# Patient Record
Sex: MALE | Race: OTHER | URBAN - METROPOLITAN AREA
[De-identification: names, ages, dates, MRNs, and addresses within clinical notes are randomized per-mention and may not be internally consistent; named-entity substitution may affect disease eponyms.]

---

## 2018-08-22 ENCOUNTER — INPATIENT (INPATIENT)
Facility: HOSPITAL | Age: 68
LOS: 9 days | Discharge: HOME | End: 2018-09-01
Attending: INTERNAL MEDICINE | Admitting: INTERNAL MEDICINE
Payer: COMMERCIAL

## 2018-08-22 VITALS
HEART RATE: 91 BPM | SYSTOLIC BLOOD PRESSURE: 96 MMHG | HEIGHT: 69 IN | RESPIRATION RATE: 16 BRPM | OXYGEN SATURATION: 93 % | TEMPERATURE: 98 F | WEIGHT: 149.91 LBS | DIASTOLIC BLOOD PRESSURE: 54 MMHG

## 2018-08-22 LAB
ALBUMIN SERPL ELPH-MCNC: 2.8 G/DL — LOW (ref 3.3–5)
ALP SERPL-CCNC: 60 U/L — SIGNIFICANT CHANGE UP (ref 40–120)
ALT FLD-CCNC: 37 U/L — SIGNIFICANT CHANGE UP (ref 12–78)
AMMONIA BLD-MCNC: 15 UMOL/L — SIGNIFICANT CHANGE UP (ref 11–32)
ANION GAP SERPL CALC-SCNC: 14 MMOL/L — SIGNIFICANT CHANGE UP (ref 5–17)
APAP SERPL-MCNC: < 2 UG/ML (ref 10–30)
APPEARANCE UR: CLEAR — SIGNIFICANT CHANGE UP
APTT BLD: 37.4 SEC — SIGNIFICANT CHANGE UP (ref 27.5–37.4)
AST SERPL-CCNC: 60 U/L — HIGH (ref 15–37)
BASE EXCESS BLDA CALC-SCNC: -6.3 MMOL/L — LOW (ref -2–2)
BILIRUB SERPL-MCNC: 1.5 MG/DL — HIGH (ref 0.2–1.2)
BILIRUB UR-MCNC: ABNORMAL
BLOOD GAS COMMENTS: SIGNIFICANT CHANGE UP
BLOOD GAS COMMENTS: SIGNIFICANT CHANGE UP
BLOOD GAS SOURCE: SIGNIFICANT CHANGE UP
BUN SERPL-MCNC: 31 MG/DL — HIGH (ref 7–23)
CALCIUM SERPL-MCNC: 8.3 MG/DL — LOW (ref 8.5–10.1)
CHLORIDE SERPL-SCNC: 103 MMOL/L — SIGNIFICANT CHANGE UP (ref 96–108)
CO2 SERPL-SCNC: 23 MMOL/L — SIGNIFICANT CHANGE UP (ref 22–31)
COLOR SPEC: ABNORMAL
CREAT SERPL-MCNC: 2.19 MG/DL — HIGH (ref 0.5–1.3)
DIFF PNL FLD: ABNORMAL
ETHANOL SERPL-MCNC: <10 MG/DL — SIGNIFICANT CHANGE UP (ref 0–10)
GLUCOSE BLDC GLUCOMTR-MCNC: 188 MG/DL — HIGH (ref 70–99)
GLUCOSE SERPL-MCNC: 181 MG/DL — HIGH (ref 70–99)
GLUCOSE UR QL: NEGATIVE MG/DL — SIGNIFICANT CHANGE UP
HCO3 BLDA-SCNC: 17 MMOL/L — LOW (ref 21–29)
HCT VFR BLD CALC: 34.8 % — LOW (ref 39–50)
HGB BLD-MCNC: 11.9 G/DL — LOW (ref 13–17)
HOROWITZ INDEX BLDA+IHG-RTO: 40 — SIGNIFICANT CHANGE UP
INR BLD: 1.45 RATIO — HIGH (ref 0.88–1.16)
KETONES UR-MCNC: ABNORMAL
LACTATE SERPL-SCNC: 5.7 MMOL/L — CRITICAL HIGH (ref 0.7–2)
LACTATE SERPL-SCNC: 5.8 MMOL/L — CRITICAL HIGH (ref 0.7–2)
LACTATE SERPL-SCNC: 6.8 MMOL/L — CRITICAL HIGH (ref 0.7–2)
LEUKOCYTE ESTERASE UR-ACNC: ABNORMAL
MCHC RBC-ENTMCNC: 33.2 PG — SIGNIFICANT CHANGE UP (ref 27–34)
MCHC RBC-ENTMCNC: 34.2 GM/DL — SIGNIFICANT CHANGE UP (ref 32–36)
MCV RBC AUTO: 97.2 FL — SIGNIFICANT CHANGE UP (ref 80–100)
NITRITE UR-MCNC: POSITIVE
NRBC # BLD: 0 /100 WBCS — SIGNIFICANT CHANGE UP (ref 0–0)
PCO2 BLDA: 28 MMHG — LOW (ref 32–46)
PCP SPEC-MCNC: SIGNIFICANT CHANGE UP
PH BLD: 7.39 — SIGNIFICANT CHANGE UP (ref 7.35–7.45)
PH UR: 5 — SIGNIFICANT CHANGE UP (ref 5–8)
PLATELET # BLD AUTO: 15 K/UL — CRITICAL LOW (ref 150–400)
PO2 BLDA: 105 MMHG — SIGNIFICANT CHANGE UP (ref 74–108)
POTASSIUM SERPL-MCNC: 4.2 MMOL/L — SIGNIFICANT CHANGE UP (ref 3.5–5.3)
POTASSIUM SERPL-SCNC: 4.2 MMOL/L — SIGNIFICANT CHANGE UP (ref 3.5–5.3)
PROT SERPL-MCNC: 7 GM/DL — SIGNIFICANT CHANGE UP (ref 6–8.3)
PROT UR-MCNC: 30 MG/DL
PROTHROM AB SERPL-ACNC: 15.9 SEC — HIGH (ref 9.8–12.7)
RBC # BLD: 3.58 M/UL — LOW (ref 4.2–5.8)
RBC # FLD: 14 % — SIGNIFICANT CHANGE UP (ref 10.3–14.5)
SALICYLATES SERPL-MCNC: <1.7 MG/DL (ref 2.8–20)
SAO2 % BLDA: 98 % — HIGH (ref 92–96)
SODIUM SERPL-SCNC: 140 MMOL/L — SIGNIFICANT CHANGE UP (ref 135–145)
SP GR SPEC: 1.02 — SIGNIFICANT CHANGE UP (ref 1.01–1.02)
TROPONIN I SERPL-MCNC: 0.02 NG/ML — SIGNIFICANT CHANGE UP (ref 0.01–0.04)
TSH SERPL-MCNC: 0.4 UIU/ML — SIGNIFICANT CHANGE UP (ref 0.36–3.74)
UROBILINOGEN FLD QL: 1 MG/DL
WBC # BLD: 8.34 K/UL — SIGNIFICANT CHANGE UP (ref 3.8–10.5)
WBC # FLD AUTO: 8.34 K/UL — SIGNIFICANT CHANGE UP (ref 3.8–10.5)

## 2018-08-22 PROCEDURE — 99291 CRITICAL CARE FIRST HOUR: CPT

## 2018-08-22 PROCEDURE — 70450 CT HEAD/BRAIN W/O DYE: CPT | Mod: 26

## 2018-08-22 PROCEDURE — 71045 X-RAY EXAM CHEST 1 VIEW: CPT | Mod: 26

## 2018-08-22 RX ORDER — VANCOMYCIN HCL 1 G
1000 VIAL (EA) INTRAVENOUS ONCE
Qty: 0 | Refills: 0 | Status: COMPLETED | OUTPATIENT
Start: 2018-08-22 | End: 2018-08-22

## 2018-08-22 RX ORDER — MIDAZOLAM HYDROCHLORIDE 1 MG/ML
1 INJECTION, SOLUTION INTRAMUSCULAR; INTRAVENOUS ONCE
Qty: 0 | Refills: 0 | Status: DISCONTINUED | OUTPATIENT
Start: 2018-08-22 | End: 2018-08-22

## 2018-08-22 RX ORDER — PIPERACILLIN AND TAZOBACTAM 4; .5 G/20ML; G/20ML
3.38 INJECTION, POWDER, LYOPHILIZED, FOR SOLUTION INTRAVENOUS ONCE
Qty: 0 | Refills: 0 | Status: COMPLETED | OUTPATIENT
Start: 2018-08-22 | End: 2018-08-22

## 2018-08-22 RX ORDER — IBUPROFEN 200 MG
400 TABLET ORAL ONCE
Qty: 0 | Refills: 0 | Status: COMPLETED | OUTPATIENT
Start: 2018-08-22 | End: 2018-08-22

## 2018-08-22 RX ORDER — SODIUM CHLORIDE 9 MG/ML
2000 INJECTION INTRAMUSCULAR; INTRAVENOUS; SUBCUTANEOUS ONCE
Qty: 0 | Refills: 0 | Status: COMPLETED | OUTPATIENT
Start: 2018-08-22 | End: 2018-08-22

## 2018-08-22 RX ORDER — ACETAMINOPHEN 500 MG
650 TABLET ORAL ONCE
Qty: 0 | Refills: 0 | Status: COMPLETED | OUTPATIENT
Start: 2018-08-22 | End: 2018-08-22

## 2018-08-22 RX ORDER — HEPARIN SODIUM 5000 [USP'U]/ML
5000 INJECTION INTRAVENOUS; SUBCUTANEOUS EVERY 12 HOURS
Qty: 0 | Refills: 0 | Status: DISCONTINUED | OUTPATIENT
Start: 2018-08-22 | End: 2018-08-22

## 2018-08-22 RX ORDER — INSULIN GLARGINE 100 [IU]/ML
16 INJECTION, SOLUTION SUBCUTANEOUS
Qty: 0 | Refills: 0 | COMMUNITY

## 2018-08-22 RX ORDER — ACARBOSE 25 MG/1
1 TABLET ORAL
Qty: 0 | Refills: 0 | COMMUNITY

## 2018-08-22 RX ORDER — OMEPRAZOLE 10 MG/1
1 CAPSULE, DELAYED RELEASE ORAL
Qty: 0 | Refills: 0 | COMMUNITY

## 2018-08-22 RX ORDER — NADOLOL 80 MG/1
2 TABLET ORAL
Qty: 0 | Refills: 0 | COMMUNITY

## 2018-08-22 RX ORDER — NOREPINEPHRINE BITARTRATE/D5W 8 MG/250ML
0.05 PLASTIC BAG, INJECTION (ML) INTRAVENOUS
Qty: 8 | Refills: 0 | Status: DISCONTINUED | OUTPATIENT
Start: 2018-08-22 | End: 2018-08-26

## 2018-08-22 RX ADMIN — SODIUM CHLORIDE 2000 MILLILITER(S): 9 INJECTION INTRAMUSCULAR; INTRAVENOUS; SUBCUTANEOUS at 18:05

## 2018-08-22 RX ADMIN — PIPERACILLIN AND TAZOBACTAM 3.38 GRAM(S): 4; .5 INJECTION, POWDER, LYOPHILIZED, FOR SOLUTION INTRAVENOUS at 16:45

## 2018-08-22 RX ADMIN — MIDAZOLAM HYDROCHLORIDE 1 MILLIGRAM(S): 1 INJECTION, SOLUTION INTRAMUSCULAR; INTRAVENOUS at 19:46

## 2018-08-22 RX ADMIN — Medication 250 MILLIGRAM(S): at 16:43

## 2018-08-22 RX ADMIN — Medication 650 MILLIGRAM(S): at 16:29

## 2018-08-22 RX ADMIN — SODIUM CHLORIDE 2000 MILLILITER(S): 9 INJECTION INTRAMUSCULAR; INTRAVENOUS; SUBCUTANEOUS at 14:20

## 2018-08-22 RX ADMIN — Medication 6.38 MICROGRAM(S)/KG/MIN: at 18:27

## 2018-08-22 RX ADMIN — PIPERACILLIN AND TAZOBACTAM 200 GRAM(S): 4; .5 INJECTION, POWDER, LYOPHILIZED, FOR SOLUTION INTRAVENOUS at 16:20

## 2018-08-22 RX ADMIN — Medication 400 MILLIGRAM(S): at 19:35

## 2018-08-22 NOTE — ED ADULT NURSE REASSESSMENT NOTE - NS ED NURSE REASSESS COMMENT FT1
Pt arrived in the Emergency Department and was A&Ox1. After receiving the fluid bolus, the patient began to perk up. The fluids had approximately 400cc infused at the time and the patient was speaking and able to verbalize his name, his date of birth. The pt still did not know where he was or why he was here. Pt had no complaints of pain at the time of the infusion or at any time during his stay. Pt is currently A&OX4 and is able to make his needs known. Pt is verbal and speaking coherently to both myself and to his family. Family states he is speaking coherently and making sense. Pt is currently awaiting the results of the repeated lactic acid which was drawn at 1725 and sent to the lab for evaluation. 2000CC of fluids completed at 1720 and immediately following, the lab was drawn. Pt is vocal and is verbalizing a desire to have a bowel movement, the family states that the pt appears to be at/or near his baseline status. Pt is stating he has no pain or discomfort at this time. Pt is not in any distress. Pt is on the bedside monitor with his ABX still running. Bilateral IV's are patent and flush without difficulty. Pt IV's are clean dry and intact. Pt assisted to the commode to have a bowel movement with the assistance of myself and the PCT. Vital signs recorded and placed in the EMR.

## 2018-08-22 NOTE — ED PROVIDER NOTE - PROGRESS NOTE DETAILS
after 2 L fluid and antbx, pt. now alert and oriented x3, speaking in full sentences and conversing with staff, back to baseline as per daughter  labs are grossly unremarkable thus far with exception of elevated lactate (which will be repeated now), low platelets, + UTI, AMBIKA with Cr of 2.19 (?dehydration)  will admit to medicine for further care; initial ICU consult cancelled now because of significant improvement in clinical status pt. becoming hypotensive, starting to get altered again, medicine requests ICU reconsult given unstable pressure and mental status changes

## 2018-08-22 NOTE — H&P ADULT - ATTENDING COMMENTS
Pt seen and examined in ED with PA    68M PMH of Hep B, liver cirrhosis on transplant list as per family, hepatocellular carcinoma s/p ablation, DM, HTN, CAD w/ stents, thrombocytopenia, brought in by family for AMS. As per daughter, pt has had increasing confusion, febrile, unable to put on pants this am, urinating on self, which began this morning. Temp in .2, hypotensive despite 4LNS so started on levophed, confused w/ lactate of 5.8. Admit to ICU for severe sepsis, septic shock, UTI, r/o bacteremia, respiratory distress in setting of SIRS response requiring BiPAP for work of breathing, acute encephalopathy, ARF with ATN    - check blood and urine cx  - cont zoysn for suspicions of gram negative sepsis due to urinary source  - ARF likely ATn from sepsis. Burton catheter  - thrombocytopenia in setting of cirrhosis  - levophed for BP support, shock state, maintain MAP > 65  - BiPAP for respiratory support, intubation if necessary  - spoke to family (daughter and wife)  - monitor blood sugar, sliding scale coverage  - NPO for now due to mental status  - pt full code    Critical Care Time: 60 min

## 2018-08-22 NOTE — H&P ADULT - NSHPLABSRESULTS_GEN_ALL_CORE
EXAM:  CT BRAIN                            PROCEDURE DATE:  08/22/2018          INTERPRETATION:  CLINICAL INFORMATION: Altered mental status this   morning, history of hepatitis C. Urinary incontinence.    COMPARISON: None available.    PROCEDURE:   Noncontrast CT of the Head was performed from the skull base to the   vertex. Coronal and Sagittal reformats were obtained.    FINDINGS:    There is no acute intracranial hemorrhage, mass effect, midline shift,   extra-axial collection, hydrocephalus, or evidence of acute vascular   territorial infarction. Mild patchy hypodensities within the   periventricular and subcortical white matter, although nonspecific,   likely reflect chronic microvascular disease. Cerebral white loss results   in mildprominence of the ventricles and sulci. Intracranial   atherosclerosis is present.    The visualized paranasal sinuses and mastoid air cells are clear.   Underpneumatized left mastoid air cells with partial opacification of the   left middle ear cavity. Paranasal sinuses are clear. Intraorbital   contents are unremarkable.    IMPRESSION:     No acute intracranial hemorrhage, mass effect, or evidence of acute   vascular territorial infarction.    Nonspecific partial opacification of the left middle ear cavity.    If clinical symptoms persist or worsen, more sensitive evaluation with   brain MRI may be obtained, if no contraindications exist.                JUAN CARLOS MELÉNDEZ M.D., ATTENDING RADIOLOGIST  This document has been electronically signed. Aug 22 2018  4:42PM

## 2018-08-22 NOTE — ED PROVIDER NOTE - MEDICAL DECISION MAKING DETAILS
69 yo M with hx hep C cirrhosis, AMS, no other source or inciting event, likely hepatic encephalopathy, r/o other causes of AMS  -sepsis w/u, AMS w/u including etoh, drug screen, ua, cx, salicylate, tsh, tylenol, lactate, ct brain, cxr, ekg, monitor, enhanced obs, rectal temp, finger stick, NS bolus hydration  -f/u results, reeval

## 2018-08-22 NOTE — ED PROVIDER NOTE - PHYSICAL EXAMINATION
Vitals: 93% on RA otherwise normal  Gen: answers yes and no to questions, no meaningful communication, NAD, laying comfortably in stretcher  Head: ncat, perrla, eomi b/l  Neck: supple, no lymphadenopathy, no midline deviation  Heart: rrr, no m/r/g  Lungs: CTA b/l, no rales/ronchi/wheezes  Abd: soft, nontender, non-distended, no rebound or guarding  Ext: no clubbing/cyanosis/edema  Neuro: sensation and muscle strength intact b/l, steady gait Vitals: 93% on RA otherwise normal  Gen: answers yes and no to questions, no meaningful communication, NAD, laying comfortably in stretcher  Head: ncat, perrla, eomi b/l  Neck: supple, no lymphadenopathy, no midline deviation  Heart: rrr, no m/r/g  Lungs: CTA b/l, no rales/ronchi/wheezes  Abd: soft, nontender, non-distended, no rebound or guarding  Ext: no clubbing/cyanosis/edema  Neuro: sensation and muscle strength intact b/l

## 2018-08-22 NOTE — H&P ADULT - HISTORY OF PRESENT ILLNESS
69 Y/O male w/ PMHx of Hep B, liver cirrhosis on transplant list as per family, DM, HTN, brought in by family for AMS. As per daughter, pt has had increasing confusion, febrile, unable to put on pants this am, urinating on self, which began this morning. While in ED, pt was noted to be febrile w/ temp of 104.2, hypotensive despite 4LNS so started on levophed, confused w/ lactate of 5.8. ICU called for further evaluation. 69 Y/O male w/ PMHx of Hep B, liver cirrhosis on transplant list as per family, DM, HTN, CAD w/ stents, brought in by family for AMS. As per daughter, pt has had increasing confusion, febrile, unable to put on pants this am, urinating on self, which began this morning. While in ED, pt was noted to be febrile w/ temp of 104.2, hypotensive despite 4LNS so started on levophed, confused w/ lactate of 5.8. ICU called for further evaluation.

## 2018-08-22 NOTE — H&P ADULT - ASSESSMENT
Called to see 69 Y/O male w/ PMHx of Hep B, liver cirrhosis on transplant list, DM, HTN, brought in by family for AMS, for temp of 104.2, hypotension despite 4LNS so started on levophed, and lactate of 5.8.     Pulm:  - repeat abg, cxr  - continue bipap and/or O2 supplementation as needed  - d/c bipapa as tolerated    Cardiac:  - continue IV fluid replacement as needed   - monitor H/H, platelets  - compression devices for DVT plophylaxis  - continue pressor support as needed     ID:  - continue broad spectrum IV abx coverage  - continue to trend lactate  - f/u cultures, adjust abx coverage as needed    General:  - admit pt to ICU under Dr. Cohn w/ hospitalist service for medicine  - continue to discuss goals of care w/ family

## 2018-08-22 NOTE — ED ADULT NURSE REASSESSMENT NOTE - NS ED NURSE REASSESS COMMENT FT1
Pt Pt was placed on Norepinephrine for a MAP of >65, BP is being maintained with titration of Norpinephrine that started at a dose of 0.05 and was titrated to achieve the BP desired by the MD. Titration will continue to occur until the pts Blood pressures have properly maintained at > 65 MAP. Pt was placed on Norepinephrine for a MAP of >65, BP is being maintained with titration of Norpinephrine that started at a dose of 0.05 and was titrated to achieve the BP desired by the MD. Titration will continue to occur until the pts Blood pressures have properly maintained at > 65 MAP. MAO > 65 was achieved by monitoring blood pressure and adjusting and titrating the rate of the Norepinephrine. Report given to receiving RN Lloyd, pts history, current condition and reason for admission discussed and reviewed, pt currently in stable condition, all 3 IV's, left AC. Right AC and right wrist IV patent and running, dressing is clean dry and intact, pts family is aware of plan of care. Pt family education deemed successful after successful teach back proves proficiency. Pt transferred up to ICU bed 4 with respiratory, Emergency Department tech, and myself. Pt transported on BiPap with the ICU PA present. Pt transported and transferred to the ICU's care without any change in patient condition.

## 2018-08-22 NOTE — ED PROVIDER NOTE - OBJECTIVE STATEMENT
67 yo M with confusion since this morning.  Pt. could not put on his pants, confused, urinating on himself.  Daughter was concerned and brought him to ER.  Pt.'s last known normal was last night at 5pm when daughter last spoke to him.  As per wife he only started to act strange on waking this morning.  No other inciting event.  No recent complaints or issues, pt. was feeling well prior to this.  ROS: unobtainable from patient  PMH: IDDM, Hep C w/Cirrhosis; Meds: Glargine, Acarbose (started two weeks ago), omeprazole, nadolol; SH: Denies smoking/drinking/drug use

## 2018-08-22 NOTE — ED PROVIDER NOTE - CRITICAL CARE PROVIDED
consult w/ pt's family directly relating to pts condition/documentation/direct patient care (not related to procedure)/additional history taking/interpretation of diagnostic studies/consultation with other physicians/conducted a detailed discussion of DNR status

## 2018-08-22 NOTE — ED ADULT NURSE NOTE - OBJECTIVE STATEMENT
Pt is a 68YOM who was brought in by his daughter for confusion. Pt was last seen yesterday by his daughter and states the pt was A&OX4, independent and self care. This morning, the pts mother reports that she called the daughter and the pt was unresponsive, lethargic and confused when he finally woke up. The daughter states she carried her dad to the car and brought him here to [facility]. Pt upon arrival is lethargic, responsive, but there is no response that was correct other than his name. Pt is currently A&OX1.

## 2018-08-22 NOTE — ED ADULT NURSE NOTE - NSIMPLEMENTINTERV_GEN_ALL_ED
Implemented All Fall with Harm Risk Interventions:  [place] to call system. Call bell, personal items and telephone within reach. Instruct patient to call for assistance. Room bathroom lighting operational. Non-slip footwear when patient is off stretcher. Physically safe environment: no spills, clutter or unnecessary equipment. Stretcher in lowest position, wheels locked, appropriate side rails in place. Provide visual cue, wrist band, yellow gown, etc. Monitor gait and stability. Monitor for mental status changes and reorient to person, place, and time. Review medications for side effects contributing to fall risk. Reinforce activity limits and safety measures with patient and family. Provide visual clues: red socks.

## 2018-08-23 LAB
ALBUMIN SERPL ELPH-MCNC: 2.5 G/DL — LOW (ref 3.3–5)
ALBUMIN SERPL ELPH-MCNC: 2.5 G/DL — LOW (ref 3.3–5)
ALP SERPL-CCNC: 59 U/L — SIGNIFICANT CHANGE UP (ref 40–120)
ALP SERPL-CCNC: 68 U/L — SIGNIFICANT CHANGE UP (ref 40–120)
ALT FLD-CCNC: 35 U/L — SIGNIFICANT CHANGE UP (ref 12–78)
ALT FLD-CCNC: 36 U/L — SIGNIFICANT CHANGE UP (ref 12–78)
ANION GAP SERPL CALC-SCNC: 15 MMOL/L — SIGNIFICANT CHANGE UP (ref 5–17)
APTT BLD: 49 SEC — HIGH (ref 27.5–37.4)
AST SERPL-CCNC: 59 U/L — HIGH (ref 15–37)
AST SERPL-CCNC: 59 U/L — HIGH (ref 15–37)
BASE EXCESS BLDA CALC-SCNC: -6.4 MMOL/L — LOW (ref -2–2)
BASOPHILS # BLD AUTO: 0.06 K/UL — SIGNIFICANT CHANGE UP (ref 0–0.2)
BASOPHILS NFR BLD AUTO: 0.3 % — SIGNIFICANT CHANGE UP (ref 0–2)
BILIRUB DIRECT SERPL-MCNC: 1.77 MG/DL — HIGH (ref 0.05–0.2)
BILIRUB INDIRECT FLD-MCNC: 1.8 MG/DL — HIGH (ref 0.2–1)
BILIRUB SERPL-MCNC: 3.6 MG/DL — HIGH (ref 0.2–1.2)
BILIRUB SERPL-MCNC: 3.6 MG/DL — HIGH (ref 0.2–1.2)
BLD GP AB SCN SERPL QL: SIGNIFICANT CHANGE UP
BLOOD GAS COMMENTS: SIGNIFICANT CHANGE UP
BLOOD GAS COMMENTS: SIGNIFICANT CHANGE UP
BLOOD GAS SOURCE: SIGNIFICANT CHANGE UP
BUN SERPL-MCNC: 39 MG/DL — HIGH (ref 7–23)
CALCIUM SERPL-MCNC: 7.3 MG/DL — LOW (ref 8.5–10.1)
CHLORIDE SERPL-SCNC: 108 MMOL/L — SIGNIFICANT CHANGE UP (ref 96–108)
CK SERPL-CCNC: 293 U/L — SIGNIFICANT CHANGE UP (ref 26–308)
CO2 SERPL-SCNC: 19 MMOL/L — LOW (ref 22–31)
CREAT SERPL-MCNC: 2.09 MG/DL — HIGH (ref 0.5–1.3)
E COLI DNA BLD POS QL NAA+NON-PROBE: SIGNIFICANT CHANGE UP
EOSINOPHIL # BLD AUTO: 0.02 K/UL — SIGNIFICANT CHANGE UP (ref 0–0.5)
EOSINOPHIL NFR BLD AUTO: 0.1 % — SIGNIFICANT CHANGE UP (ref 0–6)
GLUCOSE BLDC GLUCOMTR-MCNC: 122 MG/DL — HIGH (ref 70–99)
GLUCOSE BLDC GLUCOMTR-MCNC: 97 MG/DL — SIGNIFICANT CHANGE UP (ref 70–99)
GLUCOSE SERPL-MCNC: 131 MG/DL — HIGH (ref 70–99)
GRAM STN FLD: SIGNIFICANT CHANGE UP
GRAM STN FLD: SIGNIFICANT CHANGE UP
HAV IGM SER-ACNC: SIGNIFICANT CHANGE UP
HBA1C BLD-MCNC: 6.8 % — HIGH (ref 4–5.6)
HBV CORE IGM SER-ACNC: SIGNIFICANT CHANGE UP
HBV SURFACE AG SER-ACNC: SIGNIFICANT CHANGE UP
HCO3 BLDA-SCNC: 17 MMOL/L — LOW (ref 21–29)
HCT VFR BLD CALC: 34.7 % — LOW (ref 39–50)
HCV AB S/CO SERPL IA: 0.14 S/CO — SIGNIFICANT CHANGE UP
HCV AB SERPL-IMP: SIGNIFICANT CHANGE UP
HGB BLD-MCNC: 12 G/DL — LOW (ref 13–17)
HOROWITZ INDEX BLDA+IHG-RTO: 40 — SIGNIFICANT CHANGE UP
IMM GRANULOCYTES NFR BLD AUTO: 3.6 % — HIGH (ref 0–1.5)
INR BLD: 1.81 RATIO — HIGH (ref 0.88–1.16)
LACTATE SERPL-SCNC: 4.8 MMOL/L — CRITICAL HIGH (ref 0.7–2)
LYMPHOCYTES # BLD AUTO: 0.94 K/UL — LOW (ref 1–3.3)
LYMPHOCYTES # BLD AUTO: 4.3 % — LOW (ref 13–44)
MAGNESIUM SERPL-MCNC: 1.5 MG/DL — LOW (ref 1.6–2.6)
MCHC RBC-ENTMCNC: 33.5 PG — SIGNIFICANT CHANGE UP (ref 27–34)
MCHC RBC-ENTMCNC: 34.6 GM/DL — SIGNIFICANT CHANGE UP (ref 32–36)
MCV RBC AUTO: 96.9 FL — SIGNIFICANT CHANGE UP (ref 80–100)
METHOD TYPE: SIGNIFICANT CHANGE UP
MONOCYTES # BLD AUTO: 1.52 K/UL — HIGH (ref 0–0.9)
MONOCYTES NFR BLD AUTO: 7 % — SIGNIFICANT CHANGE UP (ref 2–14)
NEUTROPHILS # BLD AUTO: 18.43 K/UL — HIGH (ref 1.8–7.4)
NEUTROPHILS NFR BLD AUTO: 84.7 % — HIGH (ref 43–77)
NRBC # BLD: 0 /100 WBCS — SIGNIFICANT CHANGE UP (ref 0–0)
PCO2 BLDA: 30 MMHG — LOW (ref 32–46)
PH BLD: 7.38 — SIGNIFICANT CHANGE UP (ref 7.35–7.45)
PHOSPHATE SERPL-MCNC: 3.3 MG/DL — SIGNIFICANT CHANGE UP (ref 2.5–4.5)
PLATELET # BLD AUTO: 23 K/UL — LOW (ref 150–400)
PO2 BLDA: 119 MMHG — HIGH (ref 74–108)
POTASSIUM SERPL-MCNC: 3.9 MMOL/L — SIGNIFICANT CHANGE UP (ref 3.5–5.3)
POTASSIUM SERPL-SCNC: 3.9 MMOL/L — SIGNIFICANT CHANGE UP (ref 3.5–5.3)
PROT SERPL-MCNC: 6.2 GM/DL — SIGNIFICANT CHANGE UP (ref 6–8.3)
PROT SERPL-MCNC: 6.2 GM/DL — SIGNIFICANT CHANGE UP (ref 6–8.3)
PROTHROM AB SERPL-ACNC: 20 SEC — HIGH (ref 9.8–12.7)
RBC # BLD: 3.58 M/UL — LOW (ref 4.2–5.8)
RBC # FLD: 14.6 % — HIGH (ref 10.3–14.5)
SAO2 % BLDA: 98 % — HIGH (ref 92–96)
SODIUM SERPL-SCNC: 142 MMOL/L — SIGNIFICANT CHANGE UP (ref 135–145)
SPECIMEN SOURCE: SIGNIFICANT CHANGE UP
SPECIMEN SOURCE: SIGNIFICANT CHANGE UP
WBC # BLD: 21.75 K/UL — HIGH (ref 3.8–10.5)
WBC # FLD AUTO: 21.75 K/UL — HIGH (ref 3.8–10.5)

## 2018-08-23 PROCEDURE — 99291 CRITICAL CARE FIRST HOUR: CPT

## 2018-08-23 PROCEDURE — 76700 US EXAM ABDOM COMPLETE: CPT | Mod: 26

## 2018-08-23 PROCEDURE — 93010 ELECTROCARDIOGRAM REPORT: CPT

## 2018-08-23 RX ORDER — DEXTROSE 50 % IN WATER 50 %
25 SYRINGE (ML) INTRAVENOUS ONCE
Qty: 0 | Refills: 0 | Status: DISCONTINUED | OUTPATIENT
Start: 2018-08-23 | End: 2018-09-01

## 2018-08-23 RX ORDER — MAGNESIUM SULFATE 500 MG/ML
1 VIAL (ML) INJECTION ONCE
Qty: 0 | Refills: 0 | Status: COMPLETED | OUTPATIENT
Start: 2018-08-23 | End: 2018-08-23

## 2018-08-23 RX ORDER — VANCOMYCIN HCL 1 G
500 VIAL (EA) INTRAVENOUS EVERY 12 HOURS
Qty: 0 | Refills: 0 | Status: DISCONTINUED | OUTPATIENT
Start: 2018-08-23 | End: 2018-08-24

## 2018-08-23 RX ORDER — ACETAMINOPHEN 500 MG
650 TABLET ORAL EVERY 6 HOURS
Qty: 0 | Refills: 0 | Status: DISCONTINUED | OUTPATIENT
Start: 2018-08-23 | End: 2018-09-01

## 2018-08-23 RX ORDER — PIPERACILLIN AND TAZOBACTAM 4; .5 G/20ML; G/20ML
3.38 INJECTION, POWDER, LYOPHILIZED, FOR SOLUTION INTRAVENOUS EVERY 8 HOURS
Qty: 0 | Refills: 0 | Status: DISCONTINUED | OUTPATIENT
Start: 2018-08-23 | End: 2018-08-25

## 2018-08-23 RX ORDER — GLUCAGON INJECTION, SOLUTION 0.5 MG/.1ML
1 INJECTION, SOLUTION SUBCUTANEOUS ONCE
Qty: 0 | Refills: 0 | Status: DISCONTINUED | OUTPATIENT
Start: 2018-08-23 | End: 2018-09-01

## 2018-08-23 RX ORDER — GENTAMICIN SULFATE 40 MG/ML
80 VIAL (ML) INJECTION ONCE
Qty: 0 | Refills: 0 | Status: DISCONTINUED | OUTPATIENT
Start: 2018-08-23 | End: 2018-08-23

## 2018-08-23 RX ORDER — SODIUM CHLORIDE 9 MG/ML
1000 INJECTION, SOLUTION INTRAVENOUS
Qty: 0 | Refills: 0 | Status: DISCONTINUED | OUTPATIENT
Start: 2018-08-23 | End: 2018-09-01

## 2018-08-23 RX ORDER — DEXTROSE 50 % IN WATER 50 %
12.5 SYRINGE (ML) INTRAVENOUS ONCE
Qty: 0 | Refills: 0 | Status: DISCONTINUED | OUTPATIENT
Start: 2018-08-23 | End: 2018-09-01

## 2018-08-23 RX ORDER — GENTAMICIN SULFATE 40 MG/ML
120 VIAL (ML) INJECTION ONCE
Qty: 0 | Refills: 0 | Status: COMPLETED | OUTPATIENT
Start: 2018-08-23 | End: 2018-08-23

## 2018-08-23 RX ORDER — INSULIN LISPRO 100/ML
VIAL (ML) SUBCUTANEOUS
Qty: 0 | Refills: 0 | Status: DISCONTINUED | OUTPATIENT
Start: 2018-08-23 | End: 2018-09-01

## 2018-08-23 RX ORDER — DEXTROSE 50 % IN WATER 50 %
15 SYRINGE (ML) INTRAVENOUS ONCE
Qty: 0 | Refills: 0 | Status: DISCONTINUED | OUTPATIENT
Start: 2018-08-23 | End: 2018-09-01

## 2018-08-23 RX ORDER — SODIUM CHLORIDE 9 MG/ML
1000 INJECTION, SOLUTION INTRAVENOUS
Qty: 0 | Refills: 0 | Status: DISCONTINUED | OUTPATIENT
Start: 2018-08-23 | End: 2018-08-26

## 2018-08-23 RX ORDER — INSULIN LISPRO 100/ML
VIAL (ML) SUBCUTANEOUS AT BEDTIME
Qty: 0 | Refills: 0 | Status: DISCONTINUED | OUTPATIENT
Start: 2018-08-23 | End: 2018-09-01

## 2018-08-23 RX ORDER — SODIUM CHLORIDE 9 MG/ML
500 INJECTION, SOLUTION INTRAVENOUS ONCE
Qty: 0 | Refills: 0 | Status: COMPLETED | OUTPATIENT
Start: 2018-08-23 | End: 2018-08-23

## 2018-08-23 RX ORDER — SODIUM CHLORIDE 9 MG/ML
1000 INJECTION INTRAMUSCULAR; INTRAVENOUS; SUBCUTANEOUS
Qty: 0 | Refills: 0 | Status: DISCONTINUED | OUTPATIENT
Start: 2018-08-23 | End: 2018-08-23

## 2018-08-23 RX ADMIN — Medication 650 MILLIGRAM(S): at 19:52

## 2018-08-23 RX ADMIN — PIPERACILLIN AND TAZOBACTAM 25 GRAM(S): 4; .5 INJECTION, POWDER, LYOPHILIZED, FOR SOLUTION INTRAVENOUS at 14:10

## 2018-08-23 RX ADMIN — Medication 0: at 21:21

## 2018-08-23 RX ADMIN — Medication 6.38 MICROGRAM(S)/KG/MIN: at 12:00

## 2018-08-23 RX ADMIN — Medication 100 GRAM(S): at 07:15

## 2018-08-23 RX ADMIN — Medication 100 MILLIGRAM(S): at 05:50

## 2018-08-23 RX ADMIN — PIPERACILLIN AND TAZOBACTAM 25 GRAM(S): 4; .5 INJECTION, POWDER, LYOPHILIZED, FOR SOLUTION INTRAVENOUS at 21:09

## 2018-08-23 RX ADMIN — Medication 100 MILLIGRAM(S): at 17:40

## 2018-08-23 RX ADMIN — SODIUM CHLORIDE 75 MILLILITER(S): 9 INJECTION INTRAMUSCULAR; INTRAVENOUS; SUBCUTANEOUS at 03:31

## 2018-08-23 RX ADMIN — SODIUM CHLORIDE 1000 MILLILITER(S): 9 INJECTION, SOLUTION INTRAVENOUS at 01:05

## 2018-08-23 RX ADMIN — SODIUM CHLORIDE 75 MILLILITER(S): 9 INJECTION, SOLUTION INTRAVENOUS at 19:56

## 2018-08-23 RX ADMIN — PIPERACILLIN AND TAZOBACTAM 25 GRAM(S): 4; .5 INJECTION, POWDER, LYOPHILIZED, FOR SOLUTION INTRAVENOUS at 05:50

## 2018-08-23 RX ADMIN — Medication 6.38 MICROGRAM(S)/KG/MIN: at 02:36

## 2018-08-23 RX ADMIN — Medication 200 MILLIGRAM(S): at 12:42

## 2018-08-23 NOTE — PROGRESS NOTE ADULT - ASSESSMENT
Pt is a 69 yo AM with h/o CAD/stent, HTN, DM, ? Hep B, cirrhosis on transplant list as per family. Pt was brought in by family for AMS. As per daughter, pt has had increasing confusion, febrile, unable to put on pants, urinating on self, which began this morning. In the ER, pt was noted to be confused, febrile (temp of 104.2), hypotensive despite 4LNS with increased lactate and started on levophed. Today bldCx (+) for E. coli. Admitting dx: E. coli septic shock 2 to UTI vs biliary source (US abd: Cirrhotic configuration of the liver. Indeterminate hepatic lesion measuring 2.8 cm. Contrast enhanced MR may be obtained for further characterization. Splenomegaly. Cholelithiasis with nonspecific gallbladder wall thickening. No biliary ductal dilatation. Pleural effusions.    Resp: Supplemental O2 prn  ID: F/u E. coli sensitivities; Cont Zosyn and give 1 dose Aminoglycoside  CVS: Levophed  tapering off  FEN: May advance diet  GI: GI consult  Endo: Follow FS and cover with Lispro  Renal: Pt with ? AMBIKA; hydrate and follow BUN/Cr and UO    CCT: 35 min

## 2018-08-23 NOTE — DIETITIAN INITIAL EVALUATION ADULT. - NS AS NUTRI INTERV MEALS SNACK
Other (specify)/Carbohydrate - modified diet/Recommend CCHO with snack Carbohydrate - modified diet/Other (specify)/Recommend CCHO with snack/Low Na/Mineral - modified diet

## 2018-08-23 NOTE — DIETITIAN INITIAL EVALUATION ADULT. - ADHERENCE
good/Diabetic diet; Breakfast; toast, coffee or tea; Afternoon meal; noodles & meat or fish; Pt drinks water & no snacks or po supplements

## 2018-08-23 NOTE — PROGRESS NOTE ADULT - SUBJECTIVE AND OBJECTIVE BOX
HPI:  Pt is a 67 yo AM with h/o CAD/stent, HTN, DM, ? Hep B, cirrhosis on transplant list as per family. Pt was brought in by family for AMS. As per daughter, pt has had increasing confusion, febrile, unable to put on pants, urinating on self, which began this morning. In the ER, pt was noted to be confused, febrile (temp of 104.2), hypotensive despite 4LNS with increased lactate and started on levophed. Today bldCx (+) for E. coli. Admitting dx: E. coli septic shock 2 to UTI vs biliary source (US abd: Cirrhotic configuration of the liver. Indeterminate hepatic lesion measuring 2.8 cm. Contrast enhanced MR may be obtained for further characterization. Splenomegaly. Cholelithiasis with nonspecific gallbladder wall thickening. No biliary ductal dilatation. Pleural effusions.         ## Labs:  CBC:                        12.0   21.75 )-----------( 23       ( 23 Aug 2018 04:15 )             34.7     Chem:  08-23    142  |  108  |  39<H>  ----------------------------<  131<H>  3.9   |  19<L>  |  2.09<H>    Ca    7.3<L>      23 Aug 2018 04:15  Phos  3.3     08-23  Mg     1.5     08-23    TPro  6.2  /  Alb  2.5<L>  /  TBili  3.6<H>  /  DBili  1.77<H>  /  AST  59<H>  /  ALT  36  /  AlkPhos  59  08-23    Coags:  PT/INR - ( 23 Aug 2018 04:15 )   PT: 20.0 sec;   INR: 1.81 ratio         PTT - ( 23 Aug 2018 04:15 )  PTT:49.0 sec  culture blood:  --  08-22 @ 18:00            culture sputum:     Growth in anaerobic bottle: Gram Negative Rods  "Due to technical problems, Proteus sp. will Not be reported as part of  the BCID panel until further notice"  ***Blood Panel PCR results on this specimen are available  approximately 3 hours after the Gram stain result.***  Gram stain, PCR, and/or culture results may not always  correspond due to difference in methodologies.  ************************************************************  This PCR assay was performed using [employer].  The following targets are tested for: Enterococcus,  vancomycin resistant enterococci, Listeria monocytogenes,  coagulase negative staphylococci, S. aureus,  methicillin resistant S. aureus, Streptococcus agalactiae  (Group B), S. pneumoniae, S. pyogenes (Group A),  Acinetobacter baumannii, Enterobacter cloacae, E. coli,  Klebsiella oxytoca, K. pneumoniae, Proteus sp.,  Serratia marcescens, Haemophilus influenzae,  Neisseria meningitidis, Pseudomonas aeruginosa, Candida  albicans, C. glabrata, C krusei, C parapsilosis,  C. tropicalis and the KPC resistance gene.  Growth in aerobic bottle: Gram Negative Rods           culture urine:  -- 08-22 @ 18:00        ## Imaging:    ## Medications:  piperacillin/tazobactam IVPB. 3.375 Gram(s) IV Intermittent every 8 hours  vancomycin  IVPB 500 milliGRAM(s) IV Intermittent every 12 hours    norepinephrine Infusion 0.05 MICROgram(s)/kG/Min IV Continuous <Continuous>      dextrose 40% Gel 15 Gram(s) Oral once PRN  dextrose 50% Injectable 12.5 Gram(s) IV Push once  dextrose 50% Injectable 25 Gram(s) IV Push once  dextrose 50% Injectable 25 Gram(s) IV Push once  glucagon  Injectable 1 milliGRAM(s) IntraMuscular once PRN  insulin lispro (HumaLOG) corrective regimen sliding scale   SubCutaneous three times a day before meals  insulin lispro (HumaLOG) corrective regimen sliding scale   SubCutaneous at bedtime            ## Vitals:  T(C): 37.6 (08-23-18 @ 15:47), Max: 39.4 (08-22-18 @ 20:30)  HR: 89 (08-23-18 @ 16:48) (68 - 106)  BP: 102/56 (08-23-18 @ 16:00) (69/58 - 132/72)  BP(mean): 67 (08-23-18 @ 16:00) (55 - 109)  RR: 26 (08-23-18 @ 16:00) (0 - 32)  SpO2: 98% (08-23-18 @ 16:48) (94% - 100%)  Wt(kg): --  Vent:   ABG: ABG - ( 23 Aug 2018 08:38 )  pH, Arterial: x     pH, Blood: 7.38  /  pCO2: 30    /  pO2: 119   / HCO3: 17    / Base Excess: -6.4  /  SaO2: 98                08-22 @ 07:01  -  08-23 @ 07:00  --------------------------------------------------------  IN: 1331.5 mL / OUT: 600 mL / NET: 731.5 mL    08-23 @ 07:01 - 08-23 @ 17:10  --------------------------------------------------------  IN: 911.8 mL / OUT: 625 mL / NET: 286.8 mL        ## P/E:  Gen: lying comfortably in bed in no apparent distress  Lungs: CTA  Heart: RRR  Abd: Soft/+BS  Ext: No edema  Neuro: Awake/alert    CENTRAL LINE: [ ] YES [ ] NO  LOCATION:   DATE INSERTED:  REMOVE: [ ] YES [ ] NO      JEREMY: [x] YES [ ] NO    DATE INSERTED:  REMOVE:  [ ] YES [ ] NO      A-LINE:  [ ] YES [ ] NO  LOCATION:   DATE INSERTED:  REMOVE:  [ ] YES [ ] NO  EXPLAIN:        CODE STATUS: [x ] full code  [ ] DNR  [ ] DNI  [ ] [facility]  Goals of care discussion: [ ] yes

## 2018-08-23 NOTE — DIETITIAN INITIAL EVALUATION ADULT. - PHYSICAL APPEARANCE
well nourished/BMI=24; no edema; Nutrition focused physical exam conducted; Subcutaneous fat loss: [WNL ] Orbital fat pads region, [WNL ]Buccal fat region, [WNL ]Triceps region,  [WNL ]Ribs region.  Muscle wasting: [WNL ]Temples region, [WNL ]Clavicle region, [WNL ]Shoulder region, [WNL ]Scapula region, [WNL ]Interosseous region,  [WNL]thigh region, [WNL ]Calf region

## 2018-08-23 NOTE — CONSULT NOTE ADULT - ASSESSMENT
HPI:  69 Y/O male w/ PMHx of Hep B, liver cirrhosis on transplant list as per family, DM, HTN, CAD w/ stents, brought in by family for AMS. As per daughter, pt has had increasing confusion, febrile, unable to put on pants this am, urinating on self, which began this morning. While in ED, pt was noted to be febrile w/ temp of 104.2, hypotensive despite 4LNS so started on levophed, confused w/ lactate of 5.8. ICU called for further evaluation. (22 Aug 2018 21:56)  ------------------------------------ As Above ----------------------------------------------------------------------------------------------  Hospital stay complicated by gram negative bacteremia. Patient  / wife state as above. Patient  denies melena, hematochezia, hematemesis, nausea, vomiting, abdominal pain, constipation, diarrhea, or change in bowel movements   History of cirrhosis on non selective B blocker   ---------  Doubt symptoms secondary to cirrhosis. If etiology of the bacteremia is unknown , would order ultrasound to evaluate for ascites and biliary disease. Continue non selective B Blocker. Refrain from liver toxic medications if possible.  --------- Tried calling daughter [phone] but number does not ring.

## 2018-08-23 NOTE — DIETITIAN INITIAL EVALUATION ADULT. - SOURCE
Pt's primary language Mandarin & speaks some English; pt prefers wife to assist as  & medical chart review/patient/family/significant other/other (specify)

## 2018-08-23 NOTE — DIETITIAN INITIAL EVALUATION ADULT. - OTHER INFO
Pt seen for ICU admission. Pt lives with wife; both pt & wife cook & food shop. Pt with natural teeth no difficulty chewing or swallowing. Last BM x 1(8/23). Pt c/o hunger & requesting lunch. Pt managed DM type 2 with Acarbose 25mg daily & Toujeo solostar 16units am; SMBG 2 x day (fasting & HS). Noted pt with liver cirrhosis pending liver transplant.

## 2018-08-23 NOTE — CONSULT NOTE ADULT - SUBJECTIVE AND OBJECTIVE BOX
HPI:  69 Y/O male w/ PMHx of Hep B, liver cirrhosis on transplant list as per family, DM, HTN, CAD w/ stents, brought in by family for AMS. As per daughter, pt has had increasing confusion, febrile, unable to put on pants this am, urinating on self, which began this morning. While in ED, pt was noted to be febrile w/ temp of 104.2, hypotensive despite 4LNS so started on levophed, confused w/ lactate of 5.8. ICU called for further evaluation. (22 Aug 2018 21:56)  ------------------------------------ As Above ----------------------------------------------------------------------------------------------  Hospital stay complicated by gram negative bacteremia. Patient  / wife state as above. Patient  denies melena, hematochezia, hematemesis, nausea, vomiting, abdominal pain, constipation, diarrhea, or change in bowel movements   History of cirrhosis on non selective B blocker. Patient is now alert and oriented and tolerating regular diet.       PAST MEDICAL & SURGICAL HISTORY:      MEDICATIONS  (STANDING):  dextrose 5%. 1000 milliLiter(s) (50 mL/Hr) IV Continuous <Continuous>  dextrose 50% Injectable 12.5 Gram(s) IV Push once  dextrose 50% Injectable 25 Gram(s) IV Push once  dextrose 50% Injectable 25 Gram(s) IV Push once  insulin lispro (HumaLOG) corrective regimen sliding scale   SubCutaneous three times a day before meals  insulin lispro (HumaLOG) corrective regimen sliding scale   SubCutaneous at bedtime  norepinephrine Infusion 0.05 MICROgram(s)/kG/Min (6.375 mL/Hr) IV Continuous <Continuous>  piperacillin/tazobactam IVPB. 3.375 Gram(s) IV Intermittent every 8 hours  sodium chloride 0.9%. 1000 milliLiter(s) (75 mL/Hr) IV Continuous <Continuous>  vancomycin  IVPB 500 milliGRAM(s) IV Intermittent every 12 hours    MEDICATIONS  (PRN):  dextrose 40% Gel 15 Gram(s) Oral once PRN Blood Glucose LESS THAN 70 milliGRAM(s)/deciliter  glucagon  Injectable 1 milliGRAM(s) IntraMuscular once PRN Glucose LESS THAN 70 milligrams/deciliter      Allergies    No Known Allergies    Intolerances        FAMILY HISTORY:      REVIEW OF SYSTEMS:    CONSTITUTIONAL: No fever, weight loss, or fatigue  EYES: No eye pain, visual disturbances, or discharge  ENMT:  No difficulty hearing, tinnitus, vertigo; No sinus or throat pain  NECK: No pain or stiffness  BREASTS: No pain, masses, or nipple discharge  RESPIRATORY: No cough, wheezing, chills or hemoptysis; No shortness of breath  CARDIOVASCULAR: No chest pain, palpitations, dizziness, or leg swelling  GASTROINTESTINAL: See above.  GENITOURINARY: No dysuria, frequency, hematuria, or incontinence  NEUROLOGICAL: No headaches, memory loss, loss of strength, numbness, or tremors  SKIN: No itching, burning, rashes, or lesions   LYMPH NODES: No enlarged glands  ENDOCRINE: No heat or cold intolerance; No hair loss  MUSCULOSKELETAL: No joint pain or swelling; No muscle, back, or extremity pain  PSYCHIATRIC: No depression, anxiety, mood swings, or difficulty sleeping  HEME/LYMPH: No easy bruising, or bleeding gums  ALLERGY AND IMMUNOLOGIC: No hives or eczema          SOCIAL HISTORY:    FAMILY HISTORY:      Vital Signs Last 24 Hrs  T(C): 37.6 (23 Aug 2018 15:47), Max: 40.1 (22 Aug 2018 16:17)  T(F): 99.7 (23 Aug 2018 15:47), Max: 104.2 (22 Aug 2018 16:17)  HR: 95 (23 Aug 2018 15:00) (68 - 106)  BP: 108/48 (23 Aug 2018 15:00) (69/58 - 132/72)  BP(mean): 62 (23 Aug 2018 15:00) (55 - 109)  RR: 30 (23 Aug 2018 15:00) (0 - 32)  SpO2: 96% (23 Aug 2018 15:00) (94% - 100%)    PHYSICAL EXAM:    GENERAL: NAD, well-groomed, well-developed  HEAD:  Atraumatic, Normocephalic  EYES: EOMI, PERRLA, conjunctiva and sclera clear  NECK: Supple, No JVD, Normal thyroid  NERVOUS SYSTEM:  Alert & Oriented X3, Good concentration; Motor Strength 5/5 B/L upper and lower extremities;   CHEST/LUNG: Clear to percussion bilaterally; No rales, rhonchi, wheezing, or rubs  HEART: Regular rate and rhythm; No murmurs, rubs, or gallops  ABDOMEN: Soft, Nontender, Nondistended; Bowel sounds present  EXTREMITIES:  2+ Peripheral Pulses, No clubbing, cyanosis, or edema  LYMPH: No lymphadenopathy noted   RECTAL: Deferred  SKIN: No rashes or lesions    LABS:                        12.0   21.75 )-----------(        ( 23 Aug 2018 04:15 )             34.7       CBC:   @ 04:15  WBC  21.75  HGB 12.0  HCT 34.7 Plate 23  MCV 96.9   @ 14:59  WBC  8.34  HGB 11.9  HCT 34.8 Plate 15  MCV 97.2           23 Aug 2018 04:15    142    |  108    |  39     ----------------------------<  131    3.9     |  19     |  2.09   22 Aug 2018 14:59    140    |  103    |  31     ----------------------------<  181    4.2     |  23     |  2.19     Ca    7.3        23 Aug 2018 04:15  Ca    8.3        22 Aug 2018 14:59  Phos  3.3       23 Aug 2018 04:15  Mg     1.5       23 Aug 2018 04:15    TPro  6.2    /  Alb  2.5    /  TBili  3.6    /  DBili  1.77   /  AST  59     /  ALT  36     /  AlkPhos  59     23 Aug 2018 04:15  TPro  7.0    /  Alb  2.8    /  TBili  1.5    /  DBili  x      /  AST  60     /  ALT  37     /  AlkPhos  60     22 Aug 2018 14:59    PT/INR - ( 23 Aug 2018 04:15 )   PT: 20.0 sec;   INR: 1.81 ratio         PTT - ( 23 Aug 2018 04:15 )  PTT:49.0 sec  Urinalysis Basic - ( 22 Aug 2018 16:03 )    Color: Milly / Appearance: Clear / S.020 / pH: x  Gluc: x / Ketone: Small  / Bili: Small / Urobili: 1 mg/dL   Blood: x / Protein: 30 mg/dL / Nitrite: Positive   Leuk Esterase: Small / RBC: 3-5 /HPF / WBC 6-10   Sq Epi: x / Non Sq Epi: Occasional / Bacteria: Moderate          RADIOLOGY & ADDITIONAL STUDIES:

## 2018-08-23 NOTE — DIETITIAN INITIAL EVALUATION ADULT. - PERTINENT LABORATORY DATA
08-23 Na 142 mmol/L Glu 131 mg/dL<H> K+ 3.9 mmol/L Cr  2.09 mg/dL<H> BUN 39 mg/dL<H> Phos 3.3 mg/dL Alb 2.5 g/dL<L> PAB n/a   Hgb 12.0 g/dL<L> Hct 34.7 %<L>, WBC=21.75, RzdO1C=3.8%(8/23)

## 2018-08-24 DIAGNOSIS — K74.69 OTHER CIRRHOSIS OF LIVER: ICD-10-CM

## 2018-08-24 DIAGNOSIS — C22.8 MALIGNANT NEOPLASM OF LIVER, PRIMARY, UNSPECIFIED AS TO TYPE: ICD-10-CM

## 2018-08-24 DIAGNOSIS — R50.9 FEVER, UNSPECIFIED: ICD-10-CM

## 2018-08-24 LAB
-  AMIKACIN: SIGNIFICANT CHANGE UP
-  AMOXICILLIN/CLAVULANIC ACID: SIGNIFICANT CHANGE UP
-  AMPICILLIN/SULBACTAM: SIGNIFICANT CHANGE UP
-  AMPICILLIN: SIGNIFICANT CHANGE UP
-  AZTREONAM: SIGNIFICANT CHANGE UP
-  CEFAZOLIN: SIGNIFICANT CHANGE UP
-  CEFEPIME: SIGNIFICANT CHANGE UP
-  CEFOXITIN: SIGNIFICANT CHANGE UP
-  CEFTRIAXONE: SIGNIFICANT CHANGE UP
-  CIPROFLOXACIN: SIGNIFICANT CHANGE UP
-  ERTAPENEM: SIGNIFICANT CHANGE UP
-  GENTAMICIN: SIGNIFICANT CHANGE UP
-  IMIPENEM: SIGNIFICANT CHANGE UP
-  LEVOFLOXACIN: SIGNIFICANT CHANGE UP
-  MEROPENEM: SIGNIFICANT CHANGE UP
-  NITROFURANTOIN: SIGNIFICANT CHANGE UP
-  PIPERACILLIN/TAZOBACTAM: SIGNIFICANT CHANGE UP
-  TIGECYCLINE: SIGNIFICANT CHANGE UP
-  TOBRAMYCIN: SIGNIFICANT CHANGE UP
-  TRIMETHOPRIM/SULFAMETHOXAZOLE: SIGNIFICANT CHANGE UP
ABO RH CONFIRMATION: SIGNIFICANT CHANGE UP
ALBUMIN SERPL ELPH-MCNC: 2.5 G/DL — LOW (ref 3.3–5)
ALP SERPL-CCNC: 45 U/L — SIGNIFICANT CHANGE UP (ref 40–120)
ALT FLD-CCNC: 33 U/L — SIGNIFICANT CHANGE UP (ref 12–78)
ANION GAP SERPL CALC-SCNC: 12 MMOL/L — SIGNIFICANT CHANGE UP (ref 5–17)
AST SERPL-CCNC: 58 U/L — HIGH (ref 15–37)
BASOPHILS # BLD AUTO: 0 K/UL — SIGNIFICANT CHANGE UP (ref 0–0.2)
BASOPHILS NFR BLD AUTO: 0 % — SIGNIFICANT CHANGE UP (ref 0–2)
BILIRUB DIRECT SERPL-MCNC: 1.62 MG/DL — HIGH (ref 0.05–0.2)
BILIRUB INDIRECT FLD-MCNC: 1.4 MG/DL — HIGH (ref 0.2–1)
BILIRUB SERPL-MCNC: 3 MG/DL — HIGH (ref 0.2–1.2)
BUN SERPL-MCNC: 39 MG/DL — HIGH (ref 7–23)
CALCIUM SERPL-MCNC: 7.6 MG/DL — LOW (ref 8.5–10.1)
CHLORIDE SERPL-SCNC: 111 MMOL/L — HIGH (ref 96–108)
CO2 SERPL-SCNC: 21 MMOL/L — LOW (ref 22–31)
CREAT SERPL-MCNC: 1.5 MG/DL — HIGH (ref 0.5–1.3)
CULTURE RESULTS: SIGNIFICANT CHANGE UP
EOSINOPHIL # BLD AUTO: 0.01 K/UL — SIGNIFICANT CHANGE UP (ref 0–0.5)
EOSINOPHIL NFR BLD AUTO: 0.1 % — SIGNIFICANT CHANGE UP (ref 0–6)
GENTAMICIN TROUGH SERPL-MCNC: 1.2 UG/ML — SIGNIFICANT CHANGE UP (ref 0–2)
GLUCOSE BLDC GLUCOMTR-MCNC: 114 MG/DL — HIGH (ref 70–99)
GLUCOSE BLDC GLUCOMTR-MCNC: 138 MG/DL — HIGH (ref 70–99)
GLUCOSE BLDC GLUCOMTR-MCNC: 173 MG/DL — HIGH (ref 70–99)
GLUCOSE BLDC GLUCOMTR-MCNC: 179 MG/DL — HIGH (ref 70–99)
GLUCOSE SERPL-MCNC: 99 MG/DL — SIGNIFICANT CHANGE UP (ref 70–99)
HBV SURFACE AB SER-ACNC: SIGNIFICANT CHANGE UP
HCT VFR BLD CALC: 29.3 % — LOW (ref 39–50)
HCT VFR BLD CALC: 39 % — SIGNIFICANT CHANGE UP (ref 39–50)
HGB BLD-MCNC: 10 G/DL — LOW (ref 13–17)
HGB BLD-MCNC: 12.6 G/DL — LOW (ref 13–17)
IMM GRANULOCYTES NFR BLD AUTO: 19 % — HIGH (ref 0–1.5)
INR BLD: 2.15 RATIO — HIGH (ref 0.88–1.16)
LEGIONELLA AG UR QL: NEGATIVE — SIGNIFICANT CHANGE UP
LYMPHOCYTES # BLD AUTO: 0.47 K/UL — LOW (ref 1–3.3)
LYMPHOCYTES # BLD AUTO: 4.6 % — LOW (ref 13–44)
MAGNESIUM SERPL-MCNC: 2 MG/DL — SIGNIFICANT CHANGE UP (ref 1.6–2.6)
MCHC RBC-ENTMCNC: 32.3 GM/DL — SIGNIFICANT CHANGE UP (ref 32–36)
MCHC RBC-ENTMCNC: 32.6 PG — SIGNIFICANT CHANGE UP (ref 27–34)
MCHC RBC-ENTMCNC: 33 PG — SIGNIFICANT CHANGE UP (ref 27–34)
MCHC RBC-ENTMCNC: 34.1 GM/DL — SIGNIFICANT CHANGE UP (ref 32–36)
MCV RBC AUTO: 101 FL — HIGH (ref 80–100)
MCV RBC AUTO: 96.7 FL — SIGNIFICANT CHANGE UP (ref 80–100)
METHOD TYPE: SIGNIFICANT CHANGE UP
MONOCYTES # BLD AUTO: 0.46 K/UL — SIGNIFICANT CHANGE UP (ref 0–0.9)
MONOCYTES NFR BLD AUTO: 4.5 % — SIGNIFICANT CHANGE UP (ref 2–14)
NEUTROPHILS # BLD AUTO: 7.31 K/UL — SIGNIFICANT CHANGE UP (ref 1.8–7.4)
NEUTROPHILS NFR BLD AUTO: 71.8 % — SIGNIFICANT CHANGE UP (ref 43–77)
NRBC # BLD: 0 /100 WBCS — SIGNIFICANT CHANGE UP (ref 0–0)
NRBC # BLD: 0 /100 WBCS — SIGNIFICANT CHANGE UP (ref 0–0)
ORGANISM # SPEC MICROSCOPIC CNT: SIGNIFICANT CHANGE UP
ORGANISM # SPEC MICROSCOPIC CNT: SIGNIFICANT CHANGE UP
PHOSPHATE SERPL-MCNC: 2 MG/DL — LOW (ref 2.5–4.5)
PLATELET # BLD AUTO: 11 K/UL — CRITICAL LOW (ref 150–400)
PLATELET # BLD AUTO: 18 K/UL — CRITICAL LOW (ref 150–400)
POTASSIUM SERPL-MCNC: 4.2 MMOL/L — SIGNIFICANT CHANGE UP (ref 3.5–5.3)
POTASSIUM SERPL-SCNC: 4.2 MMOL/L — SIGNIFICANT CHANGE UP (ref 3.5–5.3)
PROT SERPL-MCNC: 6.4 GM/DL — SIGNIFICANT CHANGE UP (ref 6–8.3)
PROTHROM AB SERPL-ACNC: 23.8 SEC — HIGH (ref 9.8–12.7)
RBC # BLD: 3.03 M/UL — LOW (ref 4.2–5.8)
RBC # BLD: 3.86 M/UL — LOW (ref 4.2–5.8)
RBC # FLD: 15.1 % — HIGH (ref 10.3–14.5)
RBC # FLD: 15.3 % — HIGH (ref 10.3–14.5)
SODIUM SERPL-SCNC: 144 MMOL/L — SIGNIFICANT CHANGE UP (ref 135–145)
SPECIMEN SOURCE: SIGNIFICANT CHANGE UP
WBC # BLD: 10.19 K/UL — SIGNIFICANT CHANGE UP (ref 3.8–10.5)
WBC # BLD: 7.15 K/UL — SIGNIFICANT CHANGE UP (ref 3.8–10.5)
WBC # FLD AUTO: 10.19 K/UL — SIGNIFICANT CHANGE UP (ref 3.8–10.5)
WBC # FLD AUTO: 7.15 K/UL — SIGNIFICANT CHANGE UP (ref 3.8–10.5)

## 2018-08-24 PROCEDURE — 99291 CRITICAL CARE FIRST HOUR: CPT

## 2018-08-24 RX ORDER — MIDODRINE HYDROCHLORIDE 2.5 MG/1
10 TABLET ORAL EVERY 8 HOURS
Qty: 0 | Refills: 0 | Status: DISCONTINUED | OUTPATIENT
Start: 2018-08-24 | End: 2018-08-26

## 2018-08-24 RX ORDER — GENTAMICIN SULFATE 40 MG/ML
120 VIAL (ML) INJECTION ONCE
Qty: 0 | Refills: 0 | Status: COMPLETED | OUTPATIENT
Start: 2018-08-24 | End: 2018-08-24

## 2018-08-24 RX ADMIN — Medication 2: at 17:57

## 2018-08-24 RX ADMIN — Medication 0: at 06:40

## 2018-08-24 RX ADMIN — SODIUM CHLORIDE 75 MILLILITER(S): 9 INJECTION, SOLUTION INTRAVENOUS at 14:06

## 2018-08-24 RX ADMIN — Medication 650 MILLIGRAM(S): at 17:56

## 2018-08-24 RX ADMIN — Medication 100 MILLIGRAM(S): at 05:07

## 2018-08-24 RX ADMIN — Medication 100 MILLIGRAM(S): at 17:56

## 2018-08-24 RX ADMIN — SODIUM CHLORIDE 75 MILLILITER(S): 9 INJECTION, SOLUTION INTRAVENOUS at 21:53

## 2018-08-24 RX ADMIN — PIPERACILLIN AND TAZOBACTAM 25 GRAM(S): 4; .5 INJECTION, POWDER, LYOPHILIZED, FOR SOLUTION INTRAVENOUS at 05:07

## 2018-08-24 RX ADMIN — Medication 650 MILLIGRAM(S): at 04:46

## 2018-08-24 RX ADMIN — PIPERACILLIN AND TAZOBACTAM 25 GRAM(S): 4; .5 INJECTION, POWDER, LYOPHILIZED, FOR SOLUTION INTRAVENOUS at 21:51

## 2018-08-24 RX ADMIN — MIDODRINE HYDROCHLORIDE 10 MILLIGRAM(S): 2.5 TABLET ORAL at 21:51

## 2018-08-24 RX ADMIN — PIPERACILLIN AND TAZOBACTAM 25 GRAM(S): 4; .5 INJECTION, POWDER, LYOPHILIZED, FOR SOLUTION INTRAVENOUS at 14:06

## 2018-08-24 RX ADMIN — MIDODRINE HYDROCHLORIDE 10 MILLIGRAM(S): 2.5 TABLET ORAL at 14:06

## 2018-08-24 RX ADMIN — Medication 650 MILLIGRAM(S): at 11:08

## 2018-08-24 NOTE — PROGRESS NOTE ADULT - SUBJECTIVE AND OBJECTIVE BOX
HPI:  67 Y/O male w/ PMHx of Hep B, liver cirrhosis on transplant list as per family, DM, HTN, CAD w/ stents, brought in by family for AMS. As per daughter, pt has had increasing confusion, febrile, unable to put on pants this am, urinating on self, which began this morning. While in ED, pt was noted to be febrile w/ temp of 104.2, hypotensive despite 4LNS so started on levophed, confused w/ lactate of 5.8. ICU called for further evaluation. (22 Aug 2018 21:56)      24 hr events:      ## ROS:  [ ] unable to obtain  CONSTITUTIONAL: No fever, weight loss, or fatigue  EYES: No eye pain, visual disturbances, or discharge  ENMT:  No difficulty hearing, tinnitus, vertigo; No sinus or throat pain  NECK: No pain or stiffness  RESPIRATORY: No cough, wheezing, chills or hemoptysis; No shortness of breath  CARDIOVASCULAR: No chest pain, palpitations, dizziness, or leg swelling  GASTROINTESTINAL: No abdominal or epigastric pain. No nausea, vomiting, or hematemesis; No diarrhea or constipation. No melena or hematochezia.  GENITOURINARY: No dysuria, frequency, hematuria, or incontinence  NEUROLOGICAL: No headaches, memory loss, loss of strength, numbness, or tremors  SKIN: No itching, burning, rashes, or lesions   LYMPH NODES: No enlarged glands  ENDOCRINE: No heat or cold intolerance; No hair loss  MUSCULOSKELETAL: No joint pain or swelling; No muscle, back, or extremity pain  PSYCHIATRIC: No depression, anxiety, mood swings, or difficulty sleeping  HEME/LYMPH: No easy bruising, or bleeding gums  ALLERGY AND IMMUNOLOGIC: No hives or eczema    ## Labs:  CBC:                        10.0   7.15  )-----------( 18       ( 24 Aug 2018 14:58 )             29.3     Chem:  08-24    144  |  111<H>  |  39<H>  ----------------------------<  99  4.2   |  21<L>  |  1.50<H>    Ca    7.6<L>      24 Aug 2018 03:27  Phos  2.0     08-24  Mg     2.0     08-24    TPro  6.4  /  Alb  2.5<L>  /  TBili  3.0<H>  /  DBili  1.62<H>  /  AST  58<H>  /  ALT  33  /  AlkPhos  45  08-24    Coags:  PT/INR - ( 24 Aug 2018 06:12 )   PT: 23.8 sec;   INR: 2.15 ratio         PTT - ( 23 Aug 2018 04:15 )  PTT:49.0 sec        ## Imaging:    ## Medications:  piperacillin/tazobactam IVPB. 3.375 Gram(s) IV Intermittent every 8 hours    midodrine 10 milliGRAM(s) Oral every 8 hours  norepinephrine Infusion 0.05 MICROgram(s)/kG/Min IV Continuous <Continuous>      dextrose 40% Gel 15 Gram(s) Oral once PRN  dextrose 50% Injectable 12.5 Gram(s) IV Push once  dextrose 50% Injectable 25 Gram(s) IV Push once  dextrose 50% Injectable 25 Gram(s) IV Push once  glucagon  Injectable 1 milliGRAM(s) IntraMuscular once PRN  insulin lispro (HumaLOG) corrective regimen sliding scale   SubCutaneous three times a day before meals  insulin lispro (HumaLOG) corrective regimen sliding scale   SubCutaneous at bedtime        acetaminophen   Tablet 650 milliGRAM(s) Oral every 6 hours PRN      ## Vitals:  T(C): 38.6 (08-24-18 @ 18:00), Max: 39.4 (08-23-18 @ 20:00)  HR: 82 (08-24-18 @ 18:00) (66 - 101)  BP: 125/57 (08-24-18 @ 18:00) (52/33 - 137/103)  BP(mean): 71 (08-24-18 @ 18:00) (37 - 98)  RR: 21 (08-24-18 @ 18:00) (14 - 35)  SpO2: 98% (08-24-18 @ 18:00) (91% - 100%)  Wt(kg): --  Vent:   ABG: ABG - ( 23 Aug 2018 08:38 )  pH, Arterial: x     pH, Blood: 7.38  /  pCO2: 30    /  pO2: 119   / HCO3: 17    / Base Excess: -6.4  /  SaO2: 98                    08-23 @ 07:01  -  08-24 @ 07:00  --------------------------------------------------------  IN: 2240.4 mL / OUT: 910 mL / NET: 1330.4 mL    08-24 @ 07:01  - 08-24 @ 18:13  --------------------------------------------------------  IN: 1797.9 mL / OUT: 670 mL / NET: 1127.9 mL          ## P/E:  Gen: lying comfortably in bed in no apparent distress  HEENT: PERRL, EOMI  Resp: CTA B/L no c/r/w  CVS: S1S2 no m/r/g  Abd: soft NT/ND +BS  Ext: no c/c/e  Neuro: A&Ox3    CENTRAL LINE: [ ] YES [ ] NO  LOCATION:   DATE INSERTED:  REMOVE: [ ] YES [ ] NO      JEREMY: [ ] YES [ ] NO    DATE INSERTED:  REMOVE:  [ ] YES [ ] NO      A-LINE:  [ ] YES [ ] NO  LOCATION:   DATE INSERTED:  REMOVE:  [ ] YES [ ] NO  EXPLAIN:    GLOBAL ISSUE/BEST PRACTICE:  Analgesia:  Sedation:  HOB elevation: yes  Stress ulcer prophylaxis:  VTE prophylaxis:  Oral Care:  Glycemic control:  Nutrition:    CODE STATUS: [ ] full code  [ ] DNR  [ ] DNI  [ ] [facility]  Goals of care discussion: [ ] yes HPI:  68M PMH of Hep B, liver cirrhosis on transplant list as per family, hepatocellular carcinoma s/p ablation, DM, HTN, CAD w/ stents, chronic thrombocytopenia (platelet count 40s), brought in by family for AMS. As per daughter, pt has had increasing confusion on day of presentation with fever, and urinated on self. At baseline pt awake, alert, oriented. Temp in .2, hypotensive despite 4LNS,  started on levophed, confused w/ lactate of 5.8. Admit to ICU for severe sepsis, septic shock, UTI, r/o bacteremia, respiratory distress in setting of SIRS response requiring BiPAP for work of breathing, acute encephalopathy, ARF with ATN      24 hr events:  off bipap, doing well  less agitated  mental status improving, slightly lethargic but easily arousable, following commands and answering questions (in chinese and english - answering questions more appropriately when spoken to in native language)  still with fevers, had an episode of rigors this morning  weaning down levophed    ## ROS:  [x] limited due to acute metabolic encephalopathy  denies SOB  denies CP  denies abdominal pain  no HA    ## Labs:  CBC:                        10.0   7.15  )-----------( 18       ( 24 Aug 2018 14:58 )             29.3     Chem:  08-24    144  |  111<H>  |  39<H>  -------------------------------<99  4.2   |  21<L>  |  1.50<H>    Ca    7.6<L>      24 Aug 2018 03:27  Phos  2.0     08-24  Mg     2.0     08-24    TPro  6.4  /  Alb  2.5<L>  /  TBili  3.0<H>  /  DBili  1.62<H>  /  AST  58<H>  /  ALT  33  /  AlkPhos  45  08-24    Coags:  PT/INR - ( 24 Aug 2018 06:12 )   PT: 23.8 sec;   INR: 2.15 ratio    PTT - ( 23 Aug 2018 04:15 )  PTT:49.0 sec    Culture - Blood (08.22.18 @ 18:00)   Growth in anaerobic bottle: Gram Negative Rods   Growth in aerobic bottle: Gram Negative Rods    Specimen Source: .Blood Blood    Organism: Blood Culture PCR     Culture - Blood (08.22.18 @ 18:00)    Growth in aerobic bottle: Gram Negative Rods    Growth in anaerobic bottle: Gram Negative Rods    Specimen Source: .Blood Blood      Culture - Urine (08.23.18 @ 00:25)    Specimen Source: .Urine Clean Catch (Midstream)    Culture Results: In progress    Lactate, Blood (08.23.18 @ 04:15)    Lactate, Blood: 4.8 mmol/L    Lactate, Blood (08.22.18 @ 19:28)    Lactate, Blood: 6.8 mmol/L    Hepatitis B Surface Antibody (08.23.18 @ 16:34)    Hepatitis B Surface Antibody: Nonreact    Acute Hepatitis Panel (08.23.18 @ 12:29)    Hepatitis C Virus Interpretation: Nonreact    Hepatitis B Core IgM Antibody: Nonreact    Hepatitis B Surface Antigen: Nonreact    Hepatitis A IgM Antibody: Nonreact      ## Imaging:  CXR < from: Xray Chest 1 View AP/PA. (08.22.18 @ 15:31) >  Low lung volumes. Increased density in the right retrocardiac region   likely related to patient rotation.    CT head < from: CT Head No Cont (08.22.18 @ 16:30) >  No acute intracranial hemorrhage, mass effect, or evidence of acute   vascular territorial infarction.      ## Medications:  piperacillin/tazobactam IVPB. 3.375 Gram(s) IV Intermittent every 8 hours    midodrine 10 milliGRAM(s) Oral every 8 hours  norepinephrine Infusion 0.05 MICROgram(s)/kG/Min IV Continuous <Continuous>      dextrose 40% Gel 15 Gram(s) Oral once PRN  dextrose 50% Injectable 12.5 Gram(s) IV Push once  dextrose 50% Injectable 25 Gram(s) IV Push once  dextrose 50% Injectable 25 Gram(s) IV Push once  glucagon  Injectable 1 milliGRAM(s) IntraMuscular once PRN  insulin lispro (HumaLOG) corrective regimen sliding scale   SubCutaneous three times a day before meals  insulin lispro (HumaLOG) corrective regimen sliding scale   SubCutaneous at bedtime        acetaminophen   Tablet 650 milliGRAM(s) Oral every 6 hours PRN      ## Vitals:  T(C): 38.6 (08-24-18 @ 18:00), Max: 39.4 (08-23-18 @ 20:00)  HR: 82 (08-24-18 @ 18:00) (66 - 101)  BP: 125/57 (08-24-18 @ 18:00) (52/33 - 137/103)  BP(mean): 71 (08-24-18 @ 18:00) (37 - 98)  RR: 21 (08-24-18 @ 18:00) (14 - 35)  SpO2: 98% (08-24-18 @ 18:00) (91% - 100%)  ABG: ABG - ( 23 Aug 2018 08:38 )  pH, Arterial: 7.38  /  pCO2: 30    /  pO2: 119   / HCO3: 17    / Base Excess: -6.4  /  SaO2: 98            08-23 @ 07:01  -  08-24 @ 07:00  --------------------------------------------------------  IN: 2240.4 mL / OUT: 910 mL / NET: 1330.4 mL    08-24 @ 07:01  -  08-24 @ 18:13  --------------------------------------------------------  IN: 1797.9 mL / OUT: 670 mL / NET: 1127.9 mL          ## P/E:  Gen: lying comfortably in bed in no apparent distress  HEENT: PERRL, EOMI  Resp: CTA B/L, no wheeze, no rhonchi   CVS: S1S2 RRR  Abd: soft NT/ND +BS  Ext: no c/c/e  Neuro: slightly lethargic but easily arousable, follows commands, moving all extremities, mental status improving, oriented to person and place    CENTRAL LINE: [ ] YES [x] NO  LOCATION:   DATE INSERTED:  REMOVE: [ ] YES [ ] NO      LUNA: [x] YES [ ] NO    DATE INSERTED:  REMOVE:  [ ] YES [x] NO      A-LINE:  [ ] YES [x] NO  LOCATION:   DATE INSERTED:  REMOVE:  [ ] YES [ ] NO  EXPLAIN:    GLOBAL ISSUE/BEST PRACTICE:  Analgesia: n/a  Sedation: n/a  HOB elevation: yes  Stress ulcer prophylaxis: n/a  VTE prophylaxis: bilateral compression device due to thrombocytopenia  Oral Care: n/a  Glycemic control: sliding scale coverage  Nutrition: initiate po diet    CODE STATUS: [x] full code  [ ] DNR  [ ] DNI  [ ] MOLST  Goals of care discussion: [x] yes

## 2018-08-24 NOTE — PROGRESS NOTE ADULT - ASSESSMENT
Pt seen and examined on rounds with ICU team 8/24    68M PMH of Hep B, liver cirrhosis on transplant list as per family, hepatocellular carcinoma s/p ablation, DM, HTN, CAD w/ stents, chronic thrombocytopenia (platelet count 40s), brought in by family for AMS. Admit to ICU for severe gram negative sepsis, septic shock, UTI,  gram negative bacteremia, respiratory distress in setting of SIRS response requiring BiPAP for work of breathing, acute encephalopathy, ARF with ATN, acute on chronic thrombocytopenia, lactic acidosis.     1. NEURO  - acute encephalopathy in setting of sepsis, hypotension and high fevers  - mental status significantly improved but still mildly lethargic at times, but easily arousable, now answering questions appropriately and able to follow commands  - no further agitation    2. PULM  - resp distress in setting of SIRS response  - off bipap, no longer tachypneic  - observe off bipap    3. CV  - shock state due to sepsis  - add midodrine po  - wean off levophed  - maintain MAP 65  - hold home dose of nadolol in setting of shock state    4. ID  - gram negative sepsis: UTI with bacteremia  - follow up u cx  - follow up sensitivities and speciation for gram negatives in blood culture  - still with fevers 101 today  - s/p 1 dose of gentamicin  - continue with zosyn  - can d/c vanco      5 GI  - per daughter pt with hx of hepatitis B with cirrhosis on nadolol  - hepatitis screen here is negative  - daughter states pt is followed by [facility] and has been getting imaging with GI and is on the transplant list  - will need more information from pt's outpatient GI physician  - imaging here consistent with cirrhosis, however with hepatitis screen negative perhaps cirrhosis and liver failure is not due to viral hepatitis and due to other etiologies (YOUNGBLOOD, autoimmune hepatitis). questioned family for more history and daughter states she "assumed it was hepatitis B" but never confirmed with GI/hepatology if pt had hepatitis B.   - GI consult and follow up  - abdominal exam benign: soft, non tender, no mases palpated, no guarding, no rebound  - elevated INR: will monitor, no signs of bleed, may be due to underlying cirrhosis  - with improved mental status, initiate po intake: full liquid diet and advance as tolerates    6. RENAL  - acute renal with ATN in setting of sepsis  - Cr improving  - good urine output  - in next 24 hours if Cr continues to improve will d/c gavin    7. HEME  - thrombocytopenia: baseline low plt count in the 40s, now much lower likely in setting of sepsis  - no active bleeding noted, Hb stable  - pt had 1 BM that was reported by nursing to look slightly maroon in color  - will cont to monitor  - if signs of bleed occur will transfuse platelets    8, ENDO  - DM: cont sliding scale coverage    9. GEN  - OOB to chair  - PT  - full code  - spoke with daughter and wife regarding condition, plan of care    Critical Care Time: 50 min Pt seen and examined on rounds with ICU team 8/24    68M PMH of Hep B, liver cirrhosis on transplant list as per family, hepatocellular carcinoma s/p ablation, DM, HTN, CAD w/ stents, chronic thrombocytopenia (platelet count 40s), brought in by family for AMS. Admit to ICU for severe gram negative sepsis, septic shock, UTI,  gram negative bacteremia, respiratory distress in setting of SIRS response requiring BiPAP for work of breathing, acute encephalopathy, ARF with ATN, acute on chronic thrombocytopenia, lactic acidosis.     1. NEURO  - acute encephalopathy in setting of sepsis, hypotension and high fevers  - mental status significantly improved but still mildly lethargic at times, but easily arousable, now answering questions appropriately and able to follow commands  - no further agitation    2. PULM  - resp distress in setting of SIRS response  - off bipap, no longer tachypneic  - observe off bipap    3. CV  - shock state due to sepsis  - add midodrine po  - wean off levophed  - maintain MAP 65  - hold home dose of nadolol in setting of shock state    4. ID  - gram negative sepsis: UTI with bacteremia  - follow up u cx  - follow up sensitivities and speciation for gram negatives in blood culture  - still with fevers 101 today  - s/p 1 dose of gentamicin  - continue with zosyn  - can d/c vanco      5 GI  - per daughter pt with hx of hepatitis B with cirrhosis on nadolol  - hepatitis screen here is negative  - daughter states pt is followed by [facility] and has been getting imaging with GI and is on the transplant list  - will need more information from pt's outpatient GI physician  - imaging here consistent with cirrhosis, however with hepatitis screen negative perhaps cirrhosis and liver failure is not due to viral hepatitis and due to other etiologies (YOUNGBLOOD, autoimmune hepatitis). questioned family for more history and daughter states she "assumed it was hepatitis B" but never confirmed with GI/hepatology if pt had hepatitis B.   - GI consult and follow up  - abdominal exam benign: soft, non tender, no mases palpated, no guarding, no rebound  - elevated INR: will monitor, no signs of bleed, may be due to underlying cirrhosis  - with improved mental status, initiate po intake: full liquid diet and advance as tolerates  - MELD score today: 23    6. RENAL  - acute renal with ATN in setting of sepsis  - Cr improving  - good urine output  - in next 24 hours if Cr continues to improve will d/c alonso    7. HEME  - thrombocytopenia: baseline low plt count in the 40s, now much lower likely in setting of sepsis  - no active bleeding noted, Hb stable  - pt had 1 BM that was reported by nursing to look slightly maroon in color  - will cont to monitor  - if signs of bleed occur will transfuse platelets    8, ENDO  - DM: cont sliding scale coverage    9. GEN  - OOB to chair  - PT  - full code  - spoke with daughter and wife regarding condition, plan of care    Critical Care Time: 50 min

## 2018-08-24 NOTE — PROGRESS NOTE ADULT - ASSESSMENT
HPI:  69 Y/O male w/ PMHx of Hep B, liver cirrhosis on transplant list as per family, DM, HTN, CAD w/ stents, brought in by family for AMS. As per daughter, pt has had increasing confusion, febrile, unable to put on pants this am, urinating on self, which began this morning. While in ED, pt was noted to be febrile w/ temp of 104.2, hypotensive despite 4LNS so started on levophed, confused w/ lactate of 5.8. ICU called for further evaluation. (22 Aug 2018 21:56)  ------------------------------------ As Above ----------------------------------------------------------------------------------------------  Hospital stay complicated by gram negative bacteremia. Patient  / wife state as above. Patient  denies melena, hematochezia, hematemesis, nausea, vomiting, abdominal pain, constipation, diarrhea, or change in bowel movements   History of cirrhosis on non selective B blocker   ---------  Doubt symptoms secondary to cirrhosis. If etiology of the bacteremia is unknown , would order ultrasound to evaluate for ascites and biliary disease. Continue non selective B Blocker. Refrain from liver toxic medications if possible.  --------- Tried calling daughter [phone] but number does not ring.

## 2018-08-24 NOTE — PROGRESS NOTE ADULT - SUBJECTIVE AND OBJECTIVE BOX
Patient is a 68y old  Male who presents with a chief complaint of AMS (22 Aug 2018 21:56)      HPI:  67 Y/O male w/ PMHx of Hep B, liver cirrhosis on transplant list as per family, DM, HTN, CAD w/ stents, brought in by family for AMS. As per daughter, pt has had increasing confusion, febrile, unable to put on pants this am, urinating on self, which began this morning. While in ED, pt was noted to be febrile w/ temp of 104.2, hypotensive despite 4LNS so started on levophed, confused w/ lactate of 5.8. ICU called for further evaluation. (22 Aug 2018 21:56)      INTERVAL HPI/OVERNIGHT EVENTS:  The patient denies melena, hematochezia, hematemesis, nausea, vomiting, abdominal pain, constipation, diarrhea, or change in bowel movements     MEDICATIONS  (STANDING):  dextrose 5%. 1000 milliLiter(s) (50 mL/Hr) IV Continuous <Continuous>  dextrose 50% Injectable 12.5 Gram(s) IV Push once  dextrose 50% Injectable 25 Gram(s) IV Push once  dextrose 50% Injectable 25 Gram(s) IV Push once  insulin lispro (HumaLOG) corrective regimen sliding scale   SubCutaneous three times a day before meals  insulin lispro (HumaLOG) corrective regimen sliding scale   SubCutaneous at bedtime  lactated ringers. 1000 milliLiter(s) (75 mL/Hr) IV Continuous <Continuous>  midodrine 10 milliGRAM(s) Oral every 8 hours  norepinephrine Infusion 0.05 MICROgram(s)/kG/Min (6.375 mL/Hr) IV Continuous <Continuous>  piperacillin/tazobactam IVPB. 3.375 Gram(s) IV Intermittent every 8 hours    MEDICATIONS  (PRN):  acetaminophen   Tablet 650 milliGRAM(s) Oral every 6 hours PRN For Temp greater than 38 C (100.4 F)  dextrose 40% Gel 15 Gram(s) Oral once PRN Blood Glucose LESS THAN 70 milliGRAM(s)/deciliter  glucagon  Injectable 1 milliGRAM(s) IntraMuscular once PRN Glucose LESS THAN 70 milligrams/deciliter      FAMILY HISTORY:      Allergies    No Known Allergies    Intolerances        PMH/PSH:        REVIEW OF SYSTEMS:  CONSTITUTIONAL: No fever, weight loss, or fatigue  EYES: No eye pain, visual disturbances, or discharge  ENMT:  No difficulty hearing, tinnitus, vertigo; No sinus or throat pain  NECK: No pain or stiffness  BREASTS: No pain, masses, or nipple discharge  RESPIRATORY: No cough, wheezing, chills or hemoptysis; No shortness of breath  CARDIOVASCULAR: No chest pain, palpitations, dizziness, or leg swelling  GASTROINTESTINAL: See above  GENITOURINARY: No dysuria, frequency, hematuria, or incontinence  NEUROLOGICAL: No headaches, memory loss, loss of strength, numbness, or tremors  SKIN: No itching, burning, rashes, or lesions   LYMPH NODES: No enlarged glands  ENDOCRINE: No heat or cold intolerance; No hair loss  MUSCULOSKELETAL: No joint pain or swelling; No muscle, back, or extremity pain  PSYCHIATRIC: No depression, anxiety, mood swings, or difficulty sleeping  HEME/LYMPH: No easy bruising, or bleeding gums  ALLERGY AND IMMUNOLOGIC: No hives or eczema    Vital Signs Last 24 Hrs  T(C): 38.1 (24 Aug 2018 14:00), Max: 39.4 (23 Aug 2018 20:00)  T(F): 100.6 (24 Aug 2018 14:00), Max: 102.9 (23 Aug 2018 20:00)  HR: 85 (24 Aug 2018 15:00) (66 - 101)  BP: 103/58 (24 Aug 2018 15:00) (52/33 - 137/103)  BP(mean): 68 (24 Aug 2018 15:00) (37 - 98)  RR: 21 (24 Aug 2018 15:00) (14 - 35)  SpO2: 98% (24 Aug 2018 15:00) (91% - 100%)    PHYSICAL EXAM:  GENERAL: NAD, well-groomed, well-developed  HEAD:  Atraumatic, Normocephalic  EYES: EOMI, PERRLA, conjunctiva and sclera clear  NECK: Supple, No JVD, Normal thyroid  NERVOUS SYSTEM:  Alert & Oriented  CHEST/LUNG: Clear to percussion bilaterally; No rales, rhonchi, wheezing, or rubs  HEART: Regular rate and rhythm; No murmurs, rubs, or gallops  ABDOMEN: Soft, Nontender, Nondistended; Bowel sounds present  EXTREMITIES:  2+ Peripheral Pulses, No clubbing, cyanosis, or edema  LYMPH: No lymphadenopathy noted  SKIN: No rashes or lesions    LAB                          12.6   10.19 )-----------( 11       ( 24 Aug 2018 03:27 )             39.0       CBC:   @ 03:27  WBC 10.19   Hgb 12.6   Hct 39.0   Plts 11  .0   @ 04:15  WBC 21.75   Hgb 12.0   Hct 34.7   Plts 23  MCV 96.9   @ 14:59  WBC 8.34   Hgb 11.9   Hct 34.8   Plts 15  MCV 97.2      Chemistry:   @ 03:27  Na+ 144  K+ 4.2  Cl- 111  CO2 21  BUN 39  Cr 1.50      @ 04:15  Na+ 142  K+ 3.9  Cl- 108  CO2 19  BUN 39  Cr 2.09      14:59  Na+ 140  K+ 4.2  Cl- 103  CO2 23  BUN 31  Cr 2.19         Glucose, Serum: 99 mg/dL ( @ 03:27)  Glucose, Serum: 131 mg/dL ( @ 04:15)  Glucose, Serum: 181 mg/dL ( @ 14:59)      24 Aug 2018 03:27    144    |  111    |  39     ----------------------------<  99     4.2     |  21     |  1.50   23 Aug 2018 04:15    142    |  108    |  39     ----------------------------<  131    3.9     |  19     |  2.09   22 Aug 2018 14:59    140    |  103    |  31     ----------------------------<  181    4.2     |  23     |  2.19     Ca    7.6        24 Aug 2018 03:27  Ca    7.3        23 Aug 2018 04:15  Ca    8.3        22 Aug 2018 14:59  Phos  2.0       24 Aug 2018 03:27  Phos  3.3       23 Aug 2018 04:15  Mg     2.0       24 Aug 2018 03:27  Mg     1.5       23 Aug 2018 04:15    TPro  6.4    /  Alb  2.5    /  TBili  3.0    /  DBili  1.62   /  AST  58     /  ALT  33     /  AlkPhos  45     24 Aug 2018 03:27  TPro  6.2    /  Alb  2.5    /  TBili  3.6    /  DBili  1.77   /  AST  59     /  ALT  36     /  AlkPhos  59     23 Aug 2018 04:15  TPro  7.0    /  Alb  2.8    /  TBili  1.5    /  DBili  x      /  AST  60     /  ALT  37     /  AlkPhos  60     22 Aug 2018 14:59      PT/INR - ( 24 Aug 2018 06:12 )   PT: 23.8 sec;   INR: 2.15 ratio         PTT - ( 23 Aug 2018 04:15 )  PTT:49.0 sec    Urinalysis Basic - ( 22 Aug 2018 16:03 )    Color: Milly / Appearance: Clear / S.020 / pH: x  Gluc: x / Ketone: Small  / Bili: Small / Urobili: 1 mg/dL   Blood: x / Protein: 30 mg/dL / Nitrite: Positive   Leuk Esterase: Small / RBC: 3-5 /HPF / WBC 6-10   Sq Epi: x / Non Sq Epi: Occasional / Bacteria: Moderate        CAPILLARY BLOOD GLUCOSE      POCT Blood Glucose.: 114 mg/dL (24 Aug 2018 11:02)  POCT Blood Glucose.: 138 mg/dL (24 Aug 2018 06:06)  POCT Blood Glucose.: 97 mg/dL (23 Aug 2018 21:12)  POCT Blood Glucose.: 122 mg/dL (23 Aug 2018 16:25)          RADIOLOGY & ADDITIONAL TESTS:  < from: US Abdomen Complete (18 @ 14:12) >    EXAM:  US ABDOMINAL COMPLETE                            PROCEDURE DATE:  2018          INTERPRETATION:  CLINICAL INFORMATION: Septic shock    COMPARISON: None available.    TECHNIQUE: Sonography of the abdomen.     FINDINGS:    Liver: Heterogeneous echotexture. Nodular contour. Indeterminate   echogenic lesion 2.7 x 2.8 cm.    Bile ducts: Normal caliber. Common bile duct measures 4.4 mm.     Gallbladder: Cholelithiasis. Mild wall thickening. Indeterminate   sonographic Hurd's sign.        Pancreas: Obscured by bowel gas.    Spleen: 15.4 cm. Enlarged.    Right kidney: 10 cm. No hydronephrosis.        Left kidney: 10.7 cm.  No hydronephrosis.        Ascites: None.    Aorta and IVC: Aorta obscured. IVC within normal limits.    There are bilateral pleural effusions.    IMPRESSION:     Cirrhotic configuration of the liver.   Indeterminate hepatic lesion measuring 2.8 cm. Contrast enhanced MR may   be obtained for further characterization.  Splenomegaly.  Cholelithiasis with nonspecific gallbladder wall thickening.  No biliary ductal dilatation.  Pleural effusions.                SHERYL GOULD M.D., ATTENDING RADIOLOGIST  This document has been electronically signed. Aug 23 2018  2:21PM        < end of copied text >      Imaging Personally Reviewed:  [ ] YES  [ ] NO    Consultant(s) Notes Reviewed:  [ ] YES  [ ] NO    Care Discussed with Consultants/Other Providers [ ] YES  [ ] NO

## 2018-08-24 NOTE — CONSULT NOTE ADULT - SUBJECTIVE AND OBJECTIVE BOX
HPI:  67 y/o [place] male born in [facility] yrs. with hx of DM, HTN, CAD, s/p Coronary Stent placement, hx  ? Hepatitis B with Cirrhosis and Hepatocellular Carcinoma, s/p ablation, on the Liver Transplant list, admitted on  via the ER with reported onset of confusion, urinary incontinence, and inability to dress himself in the morning.  In the ER patient was found to be hypotensive and developed fever to > 104 and was rx'ed with IV fluids and then required pressors for BP support and was transferred to the ICU for further care.  Patient found to have an elevated Lactate level of 5.8 initially with NL WBC's but platelets decreased to 15,000.  Blood and Urine Cx's were obtained and patient was begun empirically on Zosyn + Vanco.  Now 2 out of 2 Blood Cx's are growing EColi in all 4 bottles and Urine Cx now has >100,000 EColi as well.  W/u with CT of the head was negative and CXR was clear.  Abdominal Sono shows no hydronephrosis, Cholelithiasis, Cirrhosis and a liver lesion.  Patient presently denies any cp, no SOB, no cough, and no c/o abdominal pain.    Only c/o some fatigue now.  Patient was transfused Platelets this AM for Platelets of 11,000 this AM. Patient received one dose of Gent yesterday.      Allergies :  No Known Allergies  Intolerances - none        Social History:  Tobacco: negative  Alcohol: negative  Recent Travel:  none        MEDICATIONS  (STANDING):  dextrose 5%. 1000 milliLiter(s) (50 mL/Hr) IV Continuous <Continuous>  dextrose 50% Injectable 12.5 Gram(s) IV Push once  dextrose 50% Injectable 25 Gram(s) IV Push once  dextrose 50% Injectable 25 Gram(s) IV Push once  insulin lispro (HumaLOG) corrective regimen sliding scale   SubCutaneous three times a day before meals  insulin lispro (HumaLOG) corrective regimen sliding scale   SubCutaneous at bedtime  lactated ringers. 1000 milliLiter(s) (75 mL/Hr) IV Continuous <Continuous>  midodrine 10 milliGRAM(s) Oral every 8 hours  norepinephrine Infusion 0.05 MICROgram(s)/kG/Min (6.375 mL/Hr) IV Continuous <Continuous>  piperacillin/tazobactam IVPB. 3.375 Gram(s) IV Intermittent every 8 hours    MEDICATIONS  (PRN):  acetaminophen   Tablet 650 milliGRAM(s) Oral every 6 hours PRN For Temp greater than 38 C (100.4 F)  dextrose 40% Gel 15 Gram(s) Oral once PRN Blood Glucose LESS THAN 70 milliGRAM(s)/deciliter  glucagon  Injectable 1 milliGRAM(s) IntraMuscular once PRN Glucose LESS THAN 70 milligrams/deciliter        LABS:  CBC Full  -  ( 24 Aug 2018 03:27 )  WBC Count : 10.19 K/uL  Hemoglobin : 12.6 g/dL  Hematocrit : 39.0 %  Platelet Count - Automated : 11 K/uL  Mean Cell Volume : 101.0 fl  Mean Cell Hemoglobin : 32.6 pg  Mean Cell Hemoglobin Concentration : 32.3 gm/dL  Auto Neutrophil # : 7.31 K/uL  Auto Lymphocyte # : 0.47 K/uL  Auto Monocyte # : 0.46 K/uL  Auto Eosinophil # : 0.01 K/uL  Auto Basophil # : 0.00 K/uL  Auto Neutrophil % : 71.8 %  Auto Lymphocyte % : 4.6 %  Auto Monocyte % : 4.5 %  Auto Eosinophil % : 0.1 %  Auto Basophil % : 0.0 %    08-    144  |  111<H>  |  39<H>  ----------------------------<  99  4.2   |  21<L>  |  1.50<H>    Ca    7.6<L>      24 Aug 2018 03:27  Phos  2.0     08-24  Mg     2.0     08-24    TPro  6.4  /  Alb  2.5<L>  /  TBili  3.0<H>  /  DBili  1.62<H>  /  AST  58<H>  /  ALT  33  /  AlkPhos  45  08-24    LIVER FUNCTIONS - ( 24 Aug 2018 03:27 )  Alb: 2.5 g/dL / Pro: 6.4 gm/dL / ALK PHOS: 45 U/L / ALT: 33 U/L / AST: 58 U/L / GGT: x           PT/INR - ( 24 Aug 2018 06:12 )   PT: 23.8 sec;   INR: 2.15 ratio       PTT - ( 23 Aug 2018 04:15 )  PTT:49.0 sec      Urinalysis Basic - ( 22 Aug 2018 16:03 )  Color: Milly / Appearance: Clear / S.020 / pH: x  Gluc: x / Ketone: Small  / Bili: Small / Urobili: 1 mg/dL   Blood: x / Protein: 30 mg/dL / Nitrite: Positive   Leuk Esterase: Small / RBC: 3-5 /HPF / WBC 6-10   Sq Epi: x / Non Sq Epi: Occasional / Bacteria: Moderate      ABG - ( 23 Aug 2018 08:38 )  pH, Arterial: x     pH, Blood: 7.38  /  pCO2: 30    /  pO2: 119   / HCO3: 17    / Base Excess: -6.4  /  SaO2: 98            CARDIAC MARKERS ( 23 Aug 2018 04:15 )  x     / x     / 293 U/L / x     / x          Gentamicin Level, Trough: 1.2 ug/mL ( @ 08:50)      Urine Legionella Ag - negative    Hepatitis Panel - Hepatitis B Surface Antibody (18 @ 16:34)    Hepatitis B Surface Antibody: Nonreact    Acute Hepatitis Panel (18 @ 12:29)    Hepatitis C Virus Interpretation: Nonreact: Hepatitis C AB  S/CO Ratio                        Interpretation  < 1.0                                     Non-Reactive  1.0 - 4.9                           Weakly-Reactive  > 5.0                                 Reactive  Non-Reactive: A person witha non-reactive HCV antibody result is  considered uninfected.  No further action is needed unless recent  infection is suspected.  In these cases, consider repeat testing later to  detect seroconversion..  Weakly-Reactive: HCV antibody test is abnormal, HCV RNA Qualitative test  will follow.  Reactive: HCV antibody test is abnormal, HCV RNA Qualitative test will  follow.  Note: HCV antibody testing is performed on the Abbott  system.    Hepatitis C Virus S/CO Ratio: 0.14 S/CO    Hepatitis B Core IgM Antibody: Nonreact    Hepatitis B Surface Antigen: Nonreact    Hepatitis A IgM Antibody: Nonreact          MICROBIOLOGY:  Specimen Source: .Urine Clean Catch (Midstream) ( @ 00:25)  Culture Results:   >100,000 CFU/ml Escherichia coli ( @ 00:25)    Specimen Source: .Blood Blood ( @ 18:00)  Culture Results:   Growth in aerobic and anaerobic bottles: Escherichia coli Susceptibility  to follow.  ,    Specimen Source: .Blood Blood ( @ 18:00)  Culture Results:   Growth in aerobic and anaerobic bottles: Escherichia coli  See previous culture [id number] ( @ 18:00)            Radiology:   CXR - < from: Xray Chest 1 View AP/PA. (18 @ 15:31) >  FINDINGS:     There are low lung volumes. There is increased density in the right   retrocardiac region which is likely secondary to patient rotation. There   is no pleural effusion or pneumothorax. The cardiomediastinal silhouette   is within normal limits.    IMPRESSION:     Low lung volumes. Increased density in the right retrocardiac region   likely related to patient rotation.                               < from: CT Head No Cont (18 @ 16:30) >  FINDINGS:    There is no acute intracranial hemorrhage, mass effect, midline shift,   extra-axial collection, hydrocephalus, or evidence of acute vascular   territorial infarction. Mild patchy hypodensities within the   periventricular and subcortical white matter, although nonspecific,   likely reflect chronic microvascular disease. Cerebral white loss results   in mildprominence of the ventricles and sulci. Intracranial   atherosclerosis is present.    The visualized paranasal sinuses and mastoid air cells are clear.   Underpneumatized left mastoid air cells with partial opacification of the   left middle ear cavity. Paranasal sinuses are clear. Intraorbital   contents are unremarkable.    IMPRESSION:     No acute intracranial hemorrhage, mass effect, or evidence of acute   vascular territorial infarction.    Nonspecific partial opacification of the left middle ear cavity.    If clinical symptoms persist or worsen, more sensitive evaluation with   brain MRI may be obtained, if no contraindications exist.                      < from: US Abdomen Complete (18 @ 14:12) >  FINDINGS:    Liver: Heterogeneous echotexture. Nodular contour. Indeterminate   echogenic lesion 2.7 x 2.8 cm.    Bile ducts: Normal caliber. Common bile duct measures 4.4 mm.     Gallbladder: Cholelithiasis. Mild wall thickening. Indeterminate   sonographic Hurd's sign.        Pancreas: Obscured by bowel gas.    Spleen: 15.4 cm. Enlarged.    Right kidney: 10 cm. No hydronephrosis.        Left kidney: 10.7 cm.  No hydronephrosis.        Ascites: None.    Aorta and IVC: Aorta obscured. IVC within normal limits.    There are bilateral pleural effusions.    IMPRESSION:     Cirrhotic configuration of the liver.   Indeterminate hepatic lesion measuring 2.8 cm. Contrast enhanced MR may   be obtained for further characterization.  Splenomegaly.  Cholelithiasis with nonspecific gallbladder wall thickening.  No biliary ductal dilatation.  Pleural effusions.        Vital Signs Last 24 Hrs  T(C): 38.1 (24 Aug 2018 14:00), Max: 39.4 (23 Aug 2018 20:00)  T(F): 100.6 (24 Aug 2018 14:00), Max: 102.9 (23 Aug 2018 20:00)  HR: 84 (24 Aug 2018 14:30) (66 - 101)  BP: 96/50 (24 Aug 2018 14:30) (52/33 - 137/103)  BP(mean): 61 (24 Aug 2018 14:30) (37 - 98)  RR: 21 (24 Aug 2018 14:30) (14 - 35)  SpO2: 98% (24 Aug 2018 14:30) (91% - 100%)  Supplemental O2: on NC O2 now      PHYSICAL EXAM    General: 67 y/o Chinese male awake, alert, lying in bed with NC O2 in place, cooperative, in NAD npw  HEENT: conj pink, sclerae mildly icteric, PERRLA, no oral lesions noted  Neck: supple, no nodes noted  Heart: RR  Lungs: clear bilaterally  Abdomen: BS+, soft, nontender to palpation, no masses or HS-megaly detected  No CVA or Spinal tenderness to palpation  Extremities: no edema LE's                    no calf tenderness to palpation  Skin:  warm, dry, no rash noted  Burton in place and draining clear yellow urine now      I&O's Summary :    23 Aug 2018 07:  -  24 Aug 2018 07:00  --------------------------------------------------------  IN: 2240.4 mL / OUT: 910 mL / NET: 1330.4 mL    24 Aug 2018 07:  -  24 Aug 2018 15:22  --------------------------------------------------------  IN: 1322.9 mL / OUT: 380 mL / NET: 942.9 mL        Impression:  67 y/o Chinese male with hx of DM, HTN, CAD, s/p Coronary Stent placement, ? Hepatitis B with Cirrhosis and Hepatocellular Ca, s/p ablation, awaiting a Liver Transplant, admitted on  with confusion, urinary incontinence, and inability to dress himself, and found to have fever to >104, thrombocytopenia, and hypotension requiring pressor support. Found to have Sepsis with Shock and now with EColi Bacteremia and UTI on Cx's.    Suggestions:  Will therefore continue present ab rx with Zosyn and rx with Gent x1 today again pending further C+S results.  Follow-up temps and labs closely.  Discussed with patient and his wife at bedside.

## 2018-08-25 LAB
-  AMIKACIN: SIGNIFICANT CHANGE UP
-  AMPICILLIN/SULBACTAM: SIGNIFICANT CHANGE UP
-  AMPICILLIN: SIGNIFICANT CHANGE UP
-  AZTREONAM: SIGNIFICANT CHANGE UP
-  CEFAZOLIN: SIGNIFICANT CHANGE UP
-  CEFEPIME: SIGNIFICANT CHANGE UP
-  CEFOXITIN: SIGNIFICANT CHANGE UP
-  CEFTRIAXONE: SIGNIFICANT CHANGE UP
-  CIPROFLOXACIN: SIGNIFICANT CHANGE UP
-  ERTAPENEM: SIGNIFICANT CHANGE UP
-  GENTAMICIN: SIGNIFICANT CHANGE UP
-  IMIPENEM: SIGNIFICANT CHANGE UP
-  LEVOFLOXACIN: SIGNIFICANT CHANGE UP
-  MEROPENEM: SIGNIFICANT CHANGE UP
-  PIPERACILLIN/TAZOBACTAM: SIGNIFICANT CHANGE UP
-  TOBRAMYCIN: SIGNIFICANT CHANGE UP
-  TRIMETHOPRIM/SULFAMETHOXAZOLE: SIGNIFICANT CHANGE UP
ALBUMIN SERPL ELPH-MCNC: 1.9 G/DL — LOW (ref 3.3–5)
ALP SERPL-CCNC: 41 U/L — SIGNIFICANT CHANGE UP (ref 40–120)
ALT FLD-CCNC: 29 U/L — SIGNIFICANT CHANGE UP (ref 12–78)
AMMONIA BLD-MCNC: 28 UMOL/L — SIGNIFICANT CHANGE UP (ref 11–32)
ANION GAP SERPL CALC-SCNC: 9 MMOL/L — SIGNIFICANT CHANGE UP (ref 5–17)
AST SERPL-CCNC: 53 U/L — HIGH (ref 15–37)
BASOPHILS # BLD AUTO: 0.02 K/UL — SIGNIFICANT CHANGE UP (ref 0–0.2)
BASOPHILS NFR BLD AUTO: 0.3 % — SIGNIFICANT CHANGE UP (ref 0–2)
BILIRUB DIRECT SERPL-MCNC: 1.72 MG/DL — HIGH (ref 0.05–0.2)
BILIRUB INDIRECT FLD-MCNC: 0.9 MG/DL — SIGNIFICANT CHANGE UP (ref 0.2–1)
BILIRUB SERPL-MCNC: 2.6 MG/DL — HIGH (ref 0.2–1.2)
BUN SERPL-MCNC: 35 MG/DL — HIGH (ref 7–23)
CALCIUM SERPL-MCNC: 7.3 MG/DL — LOW (ref 8.5–10.1)
CHLORIDE SERPL-SCNC: 110 MMOL/L — HIGH (ref 96–108)
CO2 SERPL-SCNC: 23 MMOL/L — SIGNIFICANT CHANGE UP (ref 22–31)
CREAT SERPL-MCNC: 1.07 MG/DL — SIGNIFICANT CHANGE UP (ref 0.5–1.3)
CRP SERPL-MCNC: 10.89 MG/DL — HIGH (ref 0–0.4)
CULTURE RESULTS: SIGNIFICANT CHANGE UP
CULTURE RESULTS: SIGNIFICANT CHANGE UP
EOSINOPHIL # BLD AUTO: 0.03 K/UL — SIGNIFICANT CHANGE UP (ref 0–0.5)
EOSINOPHIL NFR BLD AUTO: 0.4 % — SIGNIFICANT CHANGE UP (ref 0–6)
ERYTHROCYTE [SEDIMENTATION RATE] IN BLOOD: 50 MM/HR — HIGH (ref 0–20)
GENTAMICIN TROUGH SERPL-MCNC: 1.4 UG/ML — SIGNIFICANT CHANGE UP (ref 0–2)
GLUCOSE BLDC GLUCOMTR-MCNC: 127 MG/DL — HIGH (ref 70–99)
GLUCOSE BLDC GLUCOMTR-MCNC: 131 MG/DL — HIGH (ref 70–99)
GLUCOSE BLDC GLUCOMTR-MCNC: 133 MG/DL — HIGH (ref 70–99)
GLUCOSE BLDC GLUCOMTR-MCNC: 179 MG/DL — HIGH (ref 70–99)
GLUCOSE SERPL-MCNC: 130 MG/DL — HIGH (ref 70–99)
GRAM STN FLD: SIGNIFICANT CHANGE UP
GRAM STN FLD: SIGNIFICANT CHANGE UP
HCT VFR BLD CALC: 30.3 % — LOW (ref 39–50)
HGB BLD-MCNC: 10.5 G/DL — LOW (ref 13–17)
IMM GRANULOCYTES NFR BLD AUTO: 0.3 % — SIGNIFICANT CHANGE UP (ref 0–1.5)
INR BLD: 1.59 RATIO — HIGH (ref 0.88–1.16)
LYMPHOCYTES # BLD AUTO: 0.65 K/UL — LOW (ref 1–3.3)
LYMPHOCYTES # BLD AUTO: 8.2 % — LOW (ref 13–44)
MAGNESIUM SERPL-MCNC: 2 MG/DL — SIGNIFICANT CHANGE UP (ref 1.6–2.6)
MCHC RBC-ENTMCNC: 33.1 PG — SIGNIFICANT CHANGE UP (ref 27–34)
MCHC RBC-ENTMCNC: 34.7 GM/DL — SIGNIFICANT CHANGE UP (ref 32–36)
MCV RBC AUTO: 95.6 FL — SIGNIFICANT CHANGE UP (ref 80–100)
METHOD TYPE: SIGNIFICANT CHANGE UP
MONOCYTES # BLD AUTO: 0.86 K/UL — SIGNIFICANT CHANGE UP (ref 0–0.9)
MONOCYTES NFR BLD AUTO: 10.9 % — SIGNIFICANT CHANGE UP (ref 2–14)
NEUTROPHILS # BLD AUTO: 6.3 K/UL — SIGNIFICANT CHANGE UP (ref 1.8–7.4)
NEUTROPHILS NFR BLD AUTO: 79.9 % — HIGH (ref 43–77)
NRBC # BLD: 0 /100 WBCS — SIGNIFICANT CHANGE UP (ref 0–0)
ORGANISM # SPEC MICROSCOPIC CNT: SIGNIFICANT CHANGE UP
PHOSPHATE SERPL-MCNC: 0.7 MG/DL — CRITICAL LOW (ref 2.5–4.5)
PLATELET # BLD AUTO: 16 K/UL — CRITICAL LOW (ref 150–400)
POTASSIUM SERPL-MCNC: 3.7 MMOL/L — SIGNIFICANT CHANGE UP (ref 3.5–5.3)
POTASSIUM SERPL-SCNC: 3.7 MMOL/L — SIGNIFICANT CHANGE UP (ref 3.5–5.3)
PROT SERPL-MCNC: 5.5 GM/DL — LOW (ref 6–8.3)
PROTHROM AB SERPL-ACNC: 17.5 SEC — HIGH (ref 9.8–12.7)
RBC # BLD: 3.17 M/UL — LOW (ref 4.2–5.8)
RBC # FLD: 14.8 % — HIGH (ref 10.3–14.5)
SODIUM SERPL-SCNC: 142 MMOL/L — SIGNIFICANT CHANGE UP (ref 135–145)
SPECIMEN SOURCE: SIGNIFICANT CHANGE UP
SPECIMEN SOURCE: SIGNIFICANT CHANGE UP
WBC # BLD: 7.88 K/UL — SIGNIFICANT CHANGE UP (ref 3.8–10.5)
WBC # FLD AUTO: 7.88 K/UL — SIGNIFICANT CHANGE UP (ref 3.8–10.5)

## 2018-08-25 RX ORDER — CEFTRIAXONE 500 MG/1
2 INJECTION, POWDER, FOR SOLUTION INTRAMUSCULAR; INTRAVENOUS ONCE
Qty: 0 | Refills: 0 | Status: DISCONTINUED | OUTPATIENT
Start: 2018-08-25 | End: 2018-08-25

## 2018-08-25 RX ORDER — IBUPROFEN 200 MG
400 TABLET ORAL EVERY 6 HOURS
Qty: 0 | Refills: 0 | Status: DISCONTINUED | OUTPATIENT
Start: 2018-08-25 | End: 2018-09-01

## 2018-08-25 RX ORDER — GENTAMICIN SULFATE 40 MG/ML
120 VIAL (ML) INJECTION ONCE
Qty: 0 | Refills: 0 | Status: COMPLETED | OUTPATIENT
Start: 2018-08-25 | End: 2018-08-25

## 2018-08-25 RX ORDER — CEFTRIAXONE 500 MG/1
INJECTION, POWDER, FOR SOLUTION INTRAMUSCULAR; INTRAVENOUS
Qty: 0 | Refills: 0 | Status: DISCONTINUED | OUTPATIENT
Start: 2018-08-25 | End: 2018-08-25

## 2018-08-25 RX ORDER — CEFTRIAXONE 500 MG/1
2 INJECTION, POWDER, FOR SOLUTION INTRAMUSCULAR; INTRAVENOUS EVERY 24 HOURS
Qty: 0 | Refills: 0 | Status: DISCONTINUED | OUTPATIENT
Start: 2018-08-26 | End: 2018-09-01

## 2018-08-25 RX ORDER — CEFTRIAXONE 500 MG/1
2 INJECTION, POWDER, FOR SOLUTION INTRAMUSCULAR; INTRAVENOUS ONCE
Qty: 0 | Refills: 0 | Status: COMPLETED | OUTPATIENT
Start: 2018-08-25 | End: 2018-08-25

## 2018-08-25 RX ORDER — CEFTRIAXONE 500 MG/1
INJECTION, POWDER, FOR SOLUTION INTRAMUSCULAR; INTRAVENOUS
Qty: 0 | Refills: 0 | Status: DISCONTINUED | OUTPATIENT
Start: 2018-08-25 | End: 2018-09-01

## 2018-08-25 RX ORDER — POTASSIUM PHOSPHATE, MONOBASIC POTASSIUM PHOSPHATE, DIBASIC 236; 224 MG/ML; MG/ML
15 INJECTION, SOLUTION INTRAVENOUS ONCE
Qty: 0 | Refills: 0 | Status: COMPLETED | OUTPATIENT
Start: 2018-08-25 | End: 2018-08-25

## 2018-08-25 RX ORDER — LACTOBACILLUS ACIDOPHILUS 100MM CELL
1 CAPSULE ORAL EVERY 12 HOURS
Qty: 0 | Refills: 0 | Status: DISCONTINUED | OUTPATIENT
Start: 2018-08-25 | End: 2018-09-01

## 2018-08-25 RX ADMIN — MIDODRINE HYDROCHLORIDE 10 MILLIGRAM(S): 2.5 TABLET ORAL at 05:49

## 2018-08-25 RX ADMIN — CEFTRIAXONE 100 GRAM(S): 500 INJECTION, POWDER, FOR SOLUTION INTRAMUSCULAR; INTRAVENOUS at 13:16

## 2018-08-25 RX ADMIN — Medication 400 MILLIGRAM(S): at 11:06

## 2018-08-25 RX ADMIN — PIPERACILLIN AND TAZOBACTAM 25 GRAM(S): 4; .5 INJECTION, POWDER, LYOPHILIZED, FOR SOLUTION INTRAVENOUS at 05:49

## 2018-08-25 RX ADMIN — SODIUM CHLORIDE 75 MILLILITER(S): 9 INJECTION, SOLUTION INTRAVENOUS at 18:40

## 2018-08-25 RX ADMIN — MIDODRINE HYDROCHLORIDE 10 MILLIGRAM(S): 2.5 TABLET ORAL at 13:16

## 2018-08-25 RX ADMIN — Medication 200 MILLIGRAM(S): at 18:36

## 2018-08-25 RX ADMIN — POTASSIUM PHOSPHATE, MONOBASIC POTASSIUM PHOSPHATE, DIBASIC 63.75 MILLIMOLE(S): 236; 224 INJECTION, SOLUTION INTRAVENOUS at 10:03

## 2018-08-25 RX ADMIN — MIDODRINE HYDROCHLORIDE 10 MILLIGRAM(S): 2.5 TABLET ORAL at 22:51

## 2018-08-25 RX ADMIN — Medication 1 TABLET(S): at 18:36

## 2018-08-25 RX ADMIN — Medication 2: at 11:19

## 2018-08-25 RX ADMIN — Medication 650 MILLIGRAM(S): at 01:58

## 2018-08-25 RX ADMIN — Medication 62.5 MILLIMOLE(S): at 05:49

## 2018-08-25 NOTE — PROGRESS NOTE ADULT - SUBJECTIVE AND OBJECTIVE BOX
# CC: AMS    ## HPI:  68M PMH of Hep B, liver cirrhosis on transplant list as per family, hepatocellular carcinoma s/p ablation, DM, HTN, CAD w/ stents, chronic thrombocytopenia (platelet count 40s), brought in by family for AMS. As per daughter, pt has had increasing confusion on day of presentation with fever, and urinated on self. At baseline pt awake, alert, oriented. Temp in .2, hypotensive despite 4LNS,  started on levophed, confused w/ lactate of 5.8. Admit to ICU for severe sepsis, septic shock, UTI, r/o bacteremia, respiratory distress in setting of SIRS response requiring BiPAP for work of breathing, acute encephalopathy, ARF with ATN    **O/N:**  Borderline fever    ## ROS:  Unobtainable due to language difficulties / somnolence    ## Labs:  WBC Count: 7.88 K/uL (08-25-18 @ 03:00)  - Auto Neutrophil %: 79.9 % (08-25-18 @ 03:00)  WBC Count: 7.15 K/uL (08-24-18 @ 14:58)  WBC Count: 10.19 K/uL (08-24-18 @ 03:27)  - Auto Neutrophil %: 71.8 % (08-24-18 @ 03:27)  WBC Count: 21.75 K/uL (08-23-18 @ 04:15)  - Auto Neutrophil %: 84.7 % (08-23-18 @ 04:15)  WBC Count: 8.34 K/uL (08-22-18 @ 14:59)    ### BUN / Creatinine:  08-25 @ 03:00: 35 mg/dL / 1.07 mg/dL  08-24 @ 03:27: 39 mg/dL / 1.50 mg/dL  08-23 @ 04:15: 39 mg/dL / 2.09 mg/dL  08-22 @ 14:59: 31 mg/dL / 2.19 mg/dL    Calcium, Total Serum: 7.3 mg/dL (08-25-18 @ 03:00)  Albumin, Serum: 1.9 g/dL (08-25-18 @ 03:00)    Culture - Urine (collected 23 Aug 2018 00:25)    >100,000 CFU/ml Escherichia coli  Organism: Escherichia coli (24 Aug 2018 17:58)      -  Amikacin: S <=8      -  Amoxicillin/Clavulanic Acid: S <=8/4      -  Ampicillin: S 4 These ampicillin results predict results for amoxicillin      -  Ampicillin/Sulbactam: S <=4/2      -  Aztreonam: S <=4      -  Cefazolin: S <=2       -  Cefepime: S <=2      -  Cefoxitin: S <=4      -  Ceftriaxone: S <=1       -  Ciprofloxacin: S <=0.5      -  Ertapenem: S <=0.5      -  Gentamicin: S <=1      -  Imipenem: S <=1      -  Levofloxacin: S <=1      -  Meropenem: S <=1      -  Nitrofurantoin: S <=32 Should not be used to treat pyelonephritis      -  Piperacillin/Tazobactam: S <=8      -  Tigecycline: S <=1      -  Tobramycin: S <=2      -  Trimethoprim/Sulfamethoxazole: S <=0.5/9.5      Method Type: TEENA    Culture - Blood (collected 22 Aug 2018 18:00)    Growth in aerobic and anaerobic bottles: Escherichia coli    See previous culture [id number]    Culture - Blood (collected 22 Aug 2018 18:00)    Growth in aerobic and anaerobic bottles: Escherichia coli      -  Amikacin: S <=8      -  Ampicillin: S <=2 These ampicillin results predict results for amoxicillin      -  Ampicillin/Sulbactam: S <=4/2      -  Aztreonam: S <=4      -  Cefazolin: S <=2      -  Cefepime: S <=2      -  Cefoxitin: S <=4      -  Ceftriaxone: S <=1       -  Ciprofloxacin: S <=0.5      -  Ertapenem: S <=0.5      -  Gentamicin: S 2      -  Imipenem: S <=1      -  Levofloxacin: S <=1      -  Meropenem: S <=1      -  Piperacillin/Tazobactam: S <=8      -  Tobramycin: S <=2      -  Trimethoprim/Sulfamethoxazole: S <=0.5/9.5      Method Type: TEENA    ## Imaging: No new imaging    ## Meds:  midodrine 5 milliGRAM(s) Oral every 8 hours    cefTRIAXone   IVPB 2 Gram(s) IV Intermittent every 24 hours  lactobacillus acidophilus 1 Tablet(s) Oral every 12 hours    acetaminophen   Tablet 650 milliGRAM(s) Oral every 6 hours PRN  ibuprofen  Suspension 400 milliGRAM(s) Oral every 6 hours PRN    insulin lispro (HumaLOG) corrective regimen sliding scale   SubCutaneous three times a day before meals  insulin lispro (HumaLOG) corrective regimen sliding scale   SubCutaneous at bedtime    ## O/E:  T(C): 36.7 (26 Aug 2018 15:21), Max: 38.1 (25 Aug 2018 23:28)  HR: 76 (26 Aug 2018 16:00) (65 - 99)  BP: 127/69 (26 Aug 2018 16:00) (111/49 - 149/69)  BP(mean): 84 (26 Aug 2018 16:00) (65 - 118)  RR: 15 (26 Aug 2018 16:00) (14 - 31)  SpO2: 94% (26 Aug 2018 16:00) (93% - 100%)  IN: 2940 mL / OUT: 1065 mL / NET: 1875 mL    Gen: lying comfortably in bed in no apparent distress  HEENT: PERRL, mildly icteric  Resp: CTA B/L no c/r/w  CVS: S1S2 no m/r/g  Abd: firmly distended NT / +BS  Ext: no c/c/e  Neuro: alert and responsive    ## Daily Issues  - Analgesia: N/A  - Sedation: N/A  - HOB elevation: Y  - GI ppx (ulcers): N/A  - DVT ppx: Hep SC  - Nutrition: PO diet    ## Assessment / Plan:  68M with E. coli bacteremia and bacteruria a/w septic shock  - Off pressors  - Chronic thrombocytopenia  - Remains on midodrine, continue current dose for now hold for hypertension  - Blood cultures and urine positive pan-sensitive E. coli,    * Given sensitivities can change from Zosyn to ceftriaxone    * Will need 14 days of antibiotics for bacteremia  - Hepatic failure: [id number], MELD 13, ammonia 23  - Full liquid diet per patient preferences    ## Code status:  Goals of care discussion: [x] yes [ ] no  [x] full code  [ ] DNR  [ ] DNI  [ ] MOLST

## 2018-08-25 NOTE — PROGRESS NOTE ADULT - SUBJECTIVE AND OBJECTIVE BOX
TMAX - 102 - decreased to 99.4 today thus far    On day # 3 Zosyn - now changed to Rocephin / s/p Gent x 2 doses    Vital Signs Last 24 Hrs  T(C): 37.1 (25 Aug 2018 15:30), Max: 38.7 (24 Aug 2018 19:00)  T(F): 98.8 (25 Aug 2018 15:30), Max: 101.6 (24 Aug 2018 19:00)  HR: 62 (25 Aug 2018 16:00) (59 - 92)  BP: 105/67 (25 Aug 2018 16:00) (92/50 - 144/71)  BP(mean): 76 (25 Aug 2018 16:00) (60 - 89)  RR: 17 (25 Aug 2018 16:00) (14 - 35)  SpO2: 96% (25 Aug 2018 16:00) (86% - 100%)  Supplemental O2:  on NCO2 now    Awake, alert, no c/o cp, no abdominal pain, and no c/o cough or SOB.  Feels better and tolerating a po diet without any difficulty.  Now off pressors IV as well but on Midodrine po.      PHYSICAL EXAM  General:  awake, alert, lying in bed in semi-upright position, pleasant and cooperative, appears comfortable presently  HEENT:  conj pink, sclerae mildly icteric, PERRLA, no oral lesions noted  Neck:  supple, no nodes noted  Heart:  RR  Lungs:  clear bilaterally  Abdomen:  BS+, soft, nontender to palpation  No CVA or Spinal tenderness to palpation  Extremities: no edema LE's   Skin:  warm, dry, no rash noted  Burton in place and draining clear yellow urine now        I&O's Summary :    24 Aug 2018 07:01  -  25 Aug 2018 07:00  --------------------------------------------------------  IN: 3147.9 mL / OUT: 1425 mL / NET: 1722.9 mL    25 Aug 2018 07:01  -  25 Aug 2018 17:13  --------------------------------------------------------  IN: 1400 mL / OUT: 405 mL / NET: 995 mL        LABS:  CBC Full  -  ( 25 Aug 2018 03:00 )  WBC Count : 7.88 K/uL  Hemoglobin : 10.5 g/dL  Hematocrit : 30.3 %  Platelet Count - Automated : 16 K/uL  Mean Cell Volume : 95.6 fl  Mean Cell Hemoglobin : 33.1 pg  Mean Cell Hemoglobin Concentration : 34.7 gm/dL  Auto Neutrophil # : 6.30 K/uL  Auto Lymphocyte # : 0.65 K/uL  Auto Monocyte # : 0.86 K/uL  Auto Eosinophil # : 0.03 K/uL  Auto Basophil # : 0.02 K/uL  Auto Neutrophil % : 79.9 %  Auto Lymphocyte % : 8.2 %  Auto Monocyte % : 10.9 %  Auto Eosinophil % : 0.4 %  Auto Basophil % : 0.3 %    08-25    142  |  110<H>  |  35<H>  ----------------------------<  130<H>  3.7   |  23  |  1.07    Ca    7.3<L>      25 Aug 2018 03:00  Phos  0.7     08-25  Mg     2.0     08-25    TPro  5.5<L>  /  Alb  1.9<L>  /  TBili  2.6<H>  /  DBili  1.72<H>  /  AST  53<H>  /  ALT  29  /  AlkPhos  41  08-25    LIVER FUNCTIONS - ( 25 Aug 2018 03:00 )  Alb: 1.9 g/dL / Pro: 5.5 gm/dL / ALK PHOS: 41 U/L / ALT: 29 U/L / AST: 53 U/L / GGT: x             PT/INR - ( 25 Aug 2018 03:00 )   PT: 17.5 sec;   INR: 1.59 ratio        CRP - 10.89 ( 8/25 0     Sedimentation Rate, Erythrocyte: 50 mm/hr (08-25 @ 03:00)    Gentamicin Level, Trough: 1.4 ug/mL (08-25 @ 10:26)        MICROBIOLOGY:  Specimen Source: .Urine Clean Catch (Midstream) (08-23 @ 00:25)  Culture Results:   >100,000 CFU/ml Escherichia coli (08-23 @ 00:25)  Culture - Urine (08.23.18 @ 00:25)    -  Gentamicin: S <=1    -  Imipenem: S <=1    -  Levofloxacin: S <=1    -  Meropenem: S <=1    -  Trimethoprim/Sulfamethoxazole: S <=0.5/9.5    -  Nitrofurantoin: S <=32 Should not be used to treat pyelonephritis    -  Piperacillin/Tazobactam: S <=8    -  Tigecycline: S <=1    -  Tobramycin: S <=2    -  Amikacin: S <=8    -  Amoxicillin/Clavulanic Acid: S <=8/4    -  Ampicillin: S 4 These ampicillin results predict results for amoxicillin    -  Ampicillin/Sulbactam: S <=4/2    -  Aztreonam: S <=4    -  Cefazolin: S <=2 For uncomplicated UTI with K. pneumoniae, E. coli, or P. mirablis: TEENA <=16 is sensitive and TEENA >=32 is resistant. This also predicts results for oral agents cefaclor, cefdinir, cefpodoxime, cefprozil, cefuroxime axetil, cephalexin and locarbef for uncomplicated UTI. Note that some isolates may be susceptible to these agents while testing resistant to cefazolin.    -  Cefepime: S <=2    -  Cefoxitin: S <=4    -  Ceftriaxone: S <=1 Enterobacter, Citrobacter, and Serratia may develop resistance during prolonged therapy    -  Ciprofloxacin: S <=0.5    -  Ertapenem: S <=0.5    Specimen Source: .Urine Clean Catch (Midstream)    Culture Results:   >100,000 CFU/ml Escherichia coli    Organism Identification: Escherichia coli    Organism: Escherichia coli    Method Type: TEENA        Specimen Source: .Blood Blood (08-22 @ 18:00)  Culture Results:   Growth in aerobic and anaerobic bottles: Escherichia coli  Culture - Blood (08.22.18 @ 18:00)    -  Escherichia coli: Detec    -  Trimethoprim/Sulfamethoxazole: S <=0.5/9.5    -  Tobramycin: S <=2    -  Piperacillin/Tazobactam: S <=8    Gram Stain:   Growth in anaerobic bottle: Gram Negative Rods  Growth in aerobic bottle: Gram Negative Rods    -  Ampicillin: S <=2 These ampicillin results predict results for amoxicillin    -  Amikacin: S <=8    -  Cefepime: S <=2    -  Cefazolin: S <=2    -  Aztreonam: S <=4    -  Ampicillin/Sulbactam: S <=4/2    -  Meropenem: S <=1    -  Levofloxacin: S <=1    -  Imipenem: S <=1    -  Gentamicin: S 2    -  Ciprofloxacin: S <=0.5    -  Ertapenem: S <=0.5    -  Ceftriaxone: S <=1 Enterobacter, Citrobacter, and Serratia may develop resistance during prolonged therapy    -  Cefoxitin: S <=4    Specimen Source: .Blood Blood    Organism: Blood Culture PCR    Organism: Escherichia coli    Organism Identification: Blood Culture PCR  Escherichia coli    Method Type: PCR    Method Type: TEENA        Specimen Source: .Blood Blood (08-22 @ 18:00)  Culture Results:   Growth in aerobic and anaerobic bottles: Escherichia coli  See previous culture [id number] (08-22 @ 18:00)        Legionella Antigen, Urine: Negative (08-23 @ 12:29)        Impression:  Slowly improving clinically now on present ab rx with Zosyn + Gent x 2 doses and now changed to Rocephin for continued rx of Sepsis with Shock secondary to EColi Bacteremia with EColi UTI, with EColi isolates now reported as sensitive to all ab's tested.  Temps slowly trending downwards and BP improved, off IV pressors  now.  Noted Platelets count still very low ( 16k today ).        Suggestions:  Will therefore continue ab rx with Rocephin 2 gms q 24 hrs. and rx with Gent x 1 more dose today.  Continue to follow-up temps and labs closely.  Discussed with patient and his wife at bedside. TMAX - 102 - decreased to 99.4 today thus far    On day # 3 Zosyn - now changed to Rocephin / s/p Gent x 2 doses    Vital Signs Last 24 Hrs  T(C): 37.1 (25 Aug 2018 15:30), Max: 38.7 (24 Aug 2018 19:00)  T(F): 98.8 (25 Aug 2018 15:30), Max: 101.6 (24 Aug 2018 19:00)  HR: 62 (25 Aug 2018 16:00) (59 - 92)  BP: 105/67 (25 Aug 2018 16:00) (92/50 - 144/71)  BP(mean): 76 (25 Aug 2018 16:00) (60 - 89)  RR: 17 (25 Aug 2018 16:00) (14 - 35)  SpO2: 96% (25 Aug 2018 16:00) (86% - 100%)  Supplemental O2:  on NCO2 now    Awake, alert, no c/o cp, no abdominal pain, and no c/o cough or SOB.  Feels better and tolerating a po diet without any difficulty.  Now off pressors IV as well but on Midodrine po.      PHYSICAL EXAM  General:  awake, alert, lying in bed in semi-upright position, pleasant and cooperative, appears comfortable presently  HEENT:  conj pink, sclerae mildly icteric, PERRLA, no oral lesions noted  Neck:  supple, no nodes noted  Heart:  RR  Lungs:  clear bilaterally  Abdomen:  BS+, soft, nontender to palpation  No CVA or Spinal tenderness to palpation  Extremities: no edema LE's   Skin:  warm, dry, no rash noted      I&O's Summary :    24 Aug 2018 07:01  -  25 Aug 2018 07:00  --------------------------------------------------------  IN: 3147.9 mL / OUT: 1425 mL / NET: 1722.9 mL    25 Aug 2018 07:01  -  25 Aug 2018 17:13  --------------------------------------------------------  IN: 1400 mL / OUT: 405 mL / NET: 995 mL        LABS:  CBC Full  -  ( 25 Aug 2018 03:00 )  WBC Count : 7.88 K/uL  Hemoglobin : 10.5 g/dL  Hematocrit : 30.3 %  Platelet Count - Automated : 16 K/uL  Mean Cell Volume : 95.6 fl  Mean Cell Hemoglobin : 33.1 pg  Mean Cell Hemoglobin Concentration : 34.7 gm/dL  Auto Neutrophil # : 6.30 K/uL  Auto Lymphocyte # : 0.65 K/uL  Auto Monocyte # : 0.86 K/uL  Auto Eosinophil # : 0.03 K/uL  Auto Basophil # : 0.02 K/uL  Auto Neutrophil % : 79.9 %  Auto Lymphocyte % : 8.2 %  Auto Monocyte % : 10.9 %  Auto Eosinophil % : 0.4 %  Auto Basophil % : 0.3 %    08-25    142  |  110<H>  |  35<H>  ----------------------------<  130<H>  3.7   |  23  |  1.07    Ca    7.3<L>      25 Aug 2018 03:00  Phos  0.7     08-25  Mg     2.0     08-25    TPro  5.5<L>  /  Alb  1.9<L>  /  TBili  2.6<H>  /  DBili  1.72<H>  /  AST  53<H>  /  ALT  29  /  AlkPhos  41  08-25    LIVER FUNCTIONS - ( 25 Aug 2018 03:00 )  Alb: 1.9 g/dL / Pro: 5.5 gm/dL / ALK PHOS: 41 U/L / ALT: 29 U/L / AST: 53 U/L / GGT: x             PT/INR - ( 25 Aug 2018 03:00 )   PT: 17.5 sec;   INR: 1.59 ratio        CRP - 10.89 ( 8/25 0     Sedimentation Rate, Erythrocyte: 50 mm/hr (08-25 @ 03:00)    Gentamicin Level, Trough: 1.4 ug/mL (08-25 @ 10:26)        MICROBIOLOGY:  Specimen Source: .Urine Clean Catch (Midstream) (08-23 @ 00:25)  Culture Results:   >100,000 CFU/ml Escherichia coli (08-23 @ 00:25)  Culture - Urine (08.23.18 @ 00:25)    -  Gentamicin: S <=1    -  Imipenem: S <=1    -  Levofloxacin: S <=1    -  Meropenem: S <=1    -  Trimethoprim/Sulfamethoxazole: S <=0.5/9.5    -  Nitrofurantoin: S <=32 Should not be used to treat pyelonephritis    -  Piperacillin/Tazobactam: S <=8    -  Tigecycline: S <=1    -  Tobramycin: S <=2    -  Amikacin: S <=8    -  Amoxicillin/Clavulanic Acid: S <=8/4    -  Ampicillin: S 4 These ampicillin results predict results for amoxicillin    -  Ampicillin/Sulbactam: S <=4/2    -  Aztreonam: S <=4    -  Cefazolin: S <=2 For uncomplicated UTI with K. pneumoniae, E. coli, or P. mirablis: TEENA <=16 is sensitive and TEENA >=32 is resistant. This also predicts results for oral agents cefaclor, cefdinir, cefpodoxime, cefprozil, cefuroxime axetil, cephalexin and locarbef for uncomplicated UTI. Note that some isolates may be susceptible to these agents while testing resistant to cefazolin.    -  Cefepime: S <=2    -  Cefoxitin: S <=4    -  Ceftriaxone: S <=1 Enterobacter, Citrobacter, and Serratia may develop resistance during prolonged therapy    -  Ciprofloxacin: S <=0.5    -  Ertapenem: S <=0.5    Specimen Source: .Urine Clean Catch (Midstream)    Culture Results:   >100,000 CFU/ml Escherichia coli    Organism Identification: Escherichia coli    Organism: Escherichia coli    Method Type: TEENA        Specimen Source: .Blood Blood (08-22 @ 18:00)  Culture Results:   Growth in aerobic and anaerobic bottles: Escherichia coli  Culture - Blood (08.22.18 @ 18:00)    -  Escherichia coli: Detec    -  Trimethoprim/Sulfamethoxazole: S <=0.5/9.5    -  Tobramycin: S <=2    -  Piperacillin/Tazobactam: S <=8    Gram Stain:   Growth in anaerobic bottle: Gram Negative Rods  Growth in aerobic bottle: Gram Negative Rods    -  Ampicillin: S <=2 These ampicillin results predict results for amoxicillin    -  Amikacin: S <=8    -  Cefepime: S <=2    -  Cefazolin: S <=2    -  Aztreonam: S <=4    -  Ampicillin/Sulbactam: S <=4/2    -  Meropenem: S <=1    -  Levofloxacin: S <=1    -  Imipenem: S <=1    -  Gentamicin: S 2    -  Ciprofloxacin: S <=0.5    -  Ertapenem: S <=0.5    -  Ceftriaxone: S <=1 Enterobacter, Citrobacter, and Serratia may develop resistance during prolonged therapy    -  Cefoxitin: S <=4    Specimen Source: .Blood Blood    Organism: Blood Culture PCR    Organism: Escherichia coli    Organism Identification: Blood Culture PCR  Escherichia coli    Method Type: PCR    Method Type: TEENA        Specimen Source: .Blood Blood (08-22 @ 18:00)  Culture Results:   Growth in aerobic and anaerobic bottles: Escherichia coli  See previous culture [id number] (08-22 @ 18:00)        Legionella Antigen, Urine: Negative (08-23 @ 12:29)        Impression:  Slowly improving clinically now on present ab rx with Zosyn + Gent x 2 doses and now changed to Rocephin for continued rx of Sepsis with Shock secondary to EColi Bacteremia with EColi UTI, with EColi isolates now reported as sensitive to all ab's tested.  Temps slowly trending downwards and BP improved, off IV pressors  now.  Noted Platelets count still very low ( 16k today ).        Suggestions:  Will therefore continue ab rx with Rocephin 2 gms q 24 hrs. and rx with Gent x 1 more dose today.  Continue to follow-up temps and labs closely.  Discussed with patient and his wife at bedside.

## 2018-08-26 LAB
AFP-TM SERPL-MCNC: 2.4 NG/ML — SIGNIFICANT CHANGE UP
ALBUMIN SERPL ELPH-MCNC: 1.9 G/DL — LOW (ref 3.3–5)
ALP SERPL-CCNC: 74 U/L — SIGNIFICANT CHANGE UP (ref 40–120)
ALT FLD-CCNC: 37 U/L — SIGNIFICANT CHANGE UP (ref 12–78)
ANION GAP SERPL CALC-SCNC: 10 MMOL/L — SIGNIFICANT CHANGE UP (ref 5–17)
AST SERPL-CCNC: 63 U/L — HIGH (ref 15–37)
BASOPHILS # BLD AUTO: 0.04 K/UL — SIGNIFICANT CHANGE UP (ref 0–0.2)
BASOPHILS NFR BLD AUTO: 0.5 % — SIGNIFICANT CHANGE UP (ref 0–2)
BILIRUB DIRECT SERPL-MCNC: 1.6 MG/DL — HIGH (ref 0.05–0.2)
BILIRUB INDIRECT FLD-MCNC: 1 MG/DL — SIGNIFICANT CHANGE UP (ref 0.2–1)
BILIRUB SERPL-MCNC: 2.6 MG/DL — HIGH (ref 0.2–1.2)
BUN SERPL-MCNC: 27 MG/DL — HIGH (ref 7–23)
CALCIUM SERPL-MCNC: 7.6 MG/DL — LOW (ref 8.5–10.1)
CHLORIDE SERPL-SCNC: 109 MMOL/L — HIGH (ref 96–108)
CO2 SERPL-SCNC: 22 MMOL/L — SIGNIFICANT CHANGE UP (ref 22–31)
CREAT SERPL-MCNC: 0.86 MG/DL — SIGNIFICANT CHANGE UP (ref 0.5–1.3)
EOSINOPHIL # BLD AUTO: 0.07 K/UL — SIGNIFICANT CHANGE UP (ref 0–0.5)
EOSINOPHIL NFR BLD AUTO: 1 % — SIGNIFICANT CHANGE UP (ref 0–6)
GLUCOSE BLDC GLUCOMTR-MCNC: 122 MG/DL — HIGH (ref 70–99)
GLUCOSE BLDC GLUCOMTR-MCNC: 162 MG/DL — HIGH (ref 70–99)
GLUCOSE BLDC GLUCOMTR-MCNC: 182 MG/DL — HIGH (ref 70–99)
GLUCOSE BLDC GLUCOMTR-MCNC: 227 MG/DL — HIGH (ref 70–99)
GLUCOSE SERPL-MCNC: 118 MG/DL — HIGH (ref 70–99)
GRAM STN FLD: SIGNIFICANT CHANGE UP
HCT VFR BLD CALC: 33 % — LOW (ref 39–50)
HGB BLD-MCNC: 11.5 G/DL — LOW (ref 13–17)
IMM GRANULOCYTES NFR BLD AUTO: 0.5 % — SIGNIFICANT CHANGE UP (ref 0–1.5)
INR BLD: 1.35 RATIO — HIGH (ref 0.88–1.16)
LYMPHOCYTES # BLD AUTO: 0.73 K/UL — LOW (ref 1–3.3)
LYMPHOCYTES # BLD AUTO: 10 % — LOW (ref 13–44)
MCHC RBC-ENTMCNC: 33 PG — SIGNIFICANT CHANGE UP (ref 27–34)
MCHC RBC-ENTMCNC: 34.8 GM/DL — SIGNIFICANT CHANGE UP (ref 32–36)
MCV RBC AUTO: 94.8 FL — SIGNIFICANT CHANGE UP (ref 80–100)
MONOCYTES # BLD AUTO: 1.27 K/UL — HIGH (ref 0–0.9)
MONOCYTES NFR BLD AUTO: 17.4 % — HIGH (ref 2–14)
NEUTROPHILS # BLD AUTO: 5.15 K/UL — SIGNIFICANT CHANGE UP (ref 1.8–7.4)
NEUTROPHILS NFR BLD AUTO: 70.6 % — SIGNIFICANT CHANGE UP (ref 43–77)
NRBC # BLD: 0 /100 WBCS — SIGNIFICANT CHANGE UP (ref 0–0)
PHOSPHATE SERPL-MCNC: 1.7 MG/DL — LOW (ref 2.5–4.5)
PLATELET # BLD AUTO: 19 K/UL — CRITICAL LOW (ref 150–400)
POTASSIUM SERPL-MCNC: 3.7 MMOL/L — SIGNIFICANT CHANGE UP (ref 3.5–5.3)
POTASSIUM SERPL-SCNC: 3.7 MMOL/L — SIGNIFICANT CHANGE UP (ref 3.5–5.3)
PROT SERPL-MCNC: 5.7 GM/DL — LOW (ref 6–8.3)
PROTHROM AB SERPL-ACNC: 14.8 SEC — HIGH (ref 9.8–12.7)
RBC # BLD: 3.48 M/UL — LOW (ref 4.2–5.8)
RBC # FLD: 14.8 % — HIGH (ref 10.3–14.5)
SODIUM SERPL-SCNC: 141 MMOL/L — SIGNIFICANT CHANGE UP (ref 135–145)
SPECIMEN SOURCE: SIGNIFICANT CHANGE UP
WBC # BLD: 7.3 K/UL — SIGNIFICANT CHANGE UP (ref 3.8–10.5)
WBC # FLD AUTO: 7.3 K/UL — SIGNIFICANT CHANGE UP (ref 3.8–10.5)

## 2018-08-26 RX ORDER — POTASSIUM PHOSPHATE, MONOBASIC POTASSIUM PHOSPHATE, DIBASIC 236; 224 MG/ML; MG/ML
15 INJECTION, SOLUTION INTRAVENOUS ONCE
Qty: 0 | Refills: 0 | Status: COMPLETED | OUTPATIENT
Start: 2018-08-26 | End: 2018-08-26

## 2018-08-26 RX ORDER — MIDODRINE HYDROCHLORIDE 2.5 MG/1
5 TABLET ORAL EVERY 8 HOURS
Qty: 0 | Refills: 0 | Status: DISCONTINUED | OUTPATIENT
Start: 2018-08-26 | End: 2018-08-28

## 2018-08-26 RX ADMIN — Medication 2: at 11:59

## 2018-08-26 RX ADMIN — SODIUM CHLORIDE 75 MILLILITER(S): 9 INJECTION, SOLUTION INTRAVENOUS at 09:15

## 2018-08-26 RX ADMIN — MIDODRINE HYDROCHLORIDE 10 MILLIGRAM(S): 2.5 TABLET ORAL at 15:05

## 2018-08-26 RX ADMIN — POTASSIUM PHOSPHATE, MONOBASIC POTASSIUM PHOSPHATE, DIBASIC 63.75 MILLIMOLE(S): 236; 224 INJECTION, SOLUTION INTRAVENOUS at 12:07

## 2018-08-26 RX ADMIN — Medication 1 TABLET(S): at 17:42

## 2018-08-26 RX ADMIN — Medication 4: at 16:47

## 2018-08-26 RX ADMIN — MIDODRINE HYDROCHLORIDE 5 MILLIGRAM(S): 2.5 TABLET ORAL at 21:30

## 2018-08-26 RX ADMIN — Medication 1 TABLET(S): at 05:52

## 2018-08-26 RX ADMIN — MIDODRINE HYDROCHLORIDE 10 MILLIGRAM(S): 2.5 TABLET ORAL at 05:52

## 2018-08-26 RX ADMIN — CEFTRIAXONE 100 GRAM(S): 500 INJECTION, POWDER, FOR SOLUTION INTRAMUSCULAR; INTRAVENOUS at 11:59

## 2018-08-26 NOTE — PROGRESS NOTE ADULT - SUBJECTIVE AND OBJECTIVE BOX
Patient is a 68y old  Male who presents with a chief complaint of AMS (22 Aug 2018 21:56)      HPI:  67 Y/O male w/ PMHx of Hep B, liver cirrhosis on transplant list as per family, DM, HTN, CAD w/ stents, brought in by family for AMS. As per daughter, pt has had increasing confusion, febrile, unable to put on pants this am, urinating on self, which began this morning. While in ED, pt was noted to be febrile w/ temp of 104.2, hypotensive despite 4LNS so started on levophed, confused w/ lactate of 5.8. ICU called for further evaluation. (22 Aug 2018 21:56)      INTERVAL HPI/OVERNIGHT EVENTS:  The patient denies melena, hematochezia, hematemesis, nausea, vomiting, abdominal pain, constipation, diarrhea, or change in bowel movements Tolerating NGT feeds    MEDICATIONS  (STANDING):  cefTRIAXone   IVPB 2 Gram(s) IV Intermittent every 24 hours  cefTRIAXone   IVPB      dextrose 5%. 1000 milliLiter(s) (50 mL/Hr) IV Continuous <Continuous>  dextrose 50% Injectable 12.5 Gram(s) IV Push once  dextrose 50% Injectable 25 Gram(s) IV Push once  dextrose 50% Injectable 25 Gram(s) IV Push once  insulin lispro (HumaLOG) corrective regimen sliding scale   SubCutaneous three times a day before meals  insulin lispro (HumaLOG) corrective regimen sliding scale   SubCutaneous at bedtime  lactobacillus acidophilus 1 Tablet(s) Oral every 12 hours  midodrine 5 milliGRAM(s) Oral every 8 hours    MEDICATIONS  (PRN):  acetaminophen   Tablet 650 milliGRAM(s) Oral every 6 hours PRN For Temp greater than 38 C (100.4 F)  dextrose 40% Gel 15 Gram(s) Oral once PRN Blood Glucose LESS THAN 70 milliGRAM(s)/deciliter  glucagon  Injectable 1 milliGRAM(s) IntraMuscular once PRN Glucose LESS THAN 70 milligrams/deciliter  ibuprofen  Suspension 400 milliGRAM(s) Oral every 6 hours PRN temp> 100.4      FAMILY HISTORY:      Allergies    No Known Allergies    Intolerances        PMH/PSH:        REVIEW OF SYSTEMS: Extubated  CONSTITUTIONAL: No fever, weight loss, or fatigue  EYES: No eye pain, visual disturbances, or discharge  ENMT:  No difficulty hearing, tinnitus, vertigo; No sinus or throat pain  NECK: No pain or stiffness  BREASTS: No pain, masses, or nipple discharge  RESPIRATORY: No cough, wheezing, chills or hemoptysis; No shortness of breath  CARDIOVASCULAR: No chest pain, palpitations, dizziness, or leg swelling  GASTROINTESTINAL: See above  GENITOURINARY: No dysuria, frequency, hematuria, or incontinence  NEUROLOGICAL: No headaches, memory loss, loss of strength, numbness, or tremors  SKIN: No itching, burning, rashes, or lesions   LYMPH NODES: No enlarged glands  ENDOCRINE: No heat or cold intolerance; No hair loss  MUSCULOSKELETAL: No joint pain or swelling; No muscle, back, or extremity pain  PSYCHIATRIC: No depression, anxiety, mood swings, or difficulty sleeping  HEME/LYMPH: No easy bruising, or bleeding gums  ALLERGY AND IMMUNOLOGIC: No hives or eczema    Vital Signs Last 24 Hrs  T(C): 36.7 (26 Aug 2018 15:21), Max: 38.1 (25 Aug 2018 23:28)  T(F): 98 (26 Aug 2018 15:21), Max: 100.5 (25 Aug 2018 23:28)  HR: 77 (26 Aug 2018 13:00) (62 - 99)  BP: 135/71 (26 Aug 2018 13:00) (105/67 - 149/69)  BP(mean): 86 (26 Aug 2018 13:00) (70 - 118)  RR: 18 (26 Aug 2018 13:00) (14 - 28)  SpO2: 97% (26 Aug 2018 13:00) (93% - 100%)    PHYSICAL EXAM:  GENERAL: NAD, well-groomed, well-developed  HEAD:  Atraumatic, Normocephalic  EYES: EOMI, PERRLA, conjunctiva and sclera clear  NECK: Supple, No JVD, Normal thyroid  NERVOUS SYSTEM:  Alert & Oriented , Good concentration;   CHEST/LUNG: Clear to percussion bilaterally; No rales, rhonchi, wheezing, or rubs  HEART: Regular rate and rhythm; No murmurs, rubs, or gallops  ABDOMEN: Soft, Nontender, Nondistended; Bowel sounds present  EXTREMITIES:  2+ Peripheral Pulses, No clubbing, cyanosis, or edema  LYMPH: No lymphadenopathy noted  SKIN: No rashes or lesions    LAB                          11.5   7.30  )-----------( 19       ( 26 Aug 2018 03:06 )             33.0       CBC:  08-26 @ 03:06  WBC 7.30   Hgb 11.5   Hct 33.0   Plts 19  MCV 94.8  08-25 @ 03:00  WBC 7.88   Hgb 10.5   Hct 30.3   Plts 16  MCV 95.6  08-24 @ 14:58  WBC 7.15   Hgb 10.0   Hct 29.3   Plts 18  MCV 96.7  08-24 @ 03:27  WBC 10.19   Hgb 12.6   Hct 39.0   Plts 11  .0  08-23 @ 04:15  WBC 21.75   Hgb 12.0   Hct 34.7   Plts 23  MCV 96.9  08-22 @ 14:59  WBC 8.34   Hgb 11.9   Hct 34.8   Plts 15  MCV 97.2      Chemistry:  08-26 @ 03:06  Na+ 141  K+ 3.7  Cl- 109  CO2 22  BUN 27  Cr 0.86     08-25 @ 03:00  Na+ 142  K+ 3.7  Cl- 110  CO2 23  BUN 35  Cr 1.07     08-24 @ 03:27  Na+ 144  K+ 4.2  Cl- 111  CO2 21  BUN 39  Cr 1.50     08-23 @ 04:15  Na+ 142  K+ 3.9  Cl- 108  CO2 19  BUN 39  Cr 2.09     08-22 @ 14:59  Na+ 140  K+ 4.2  Cl- 103  CO2 23  BUN 31  Cr 2.19         Glucose, Serum: 118 mg/dL (08-26 @ 03:06)  Glucose, Serum: 130 mg/dL (08-25 @ 03:00)  Glucose, Serum: 99 mg/dL (08-24 @ 03:27)  Glucose, Serum: 131 mg/dL (08-23 @ 04:15)  Glucose, Serum: 181 mg/dL (08-22 @ 14:59)      26 Aug 2018 03:06    141    |  109    |  27     ----------------------------<  118    3.7     |  22     |  0.86   25 Aug 2018 03:00    142    |  110    |  35     ----------------------------<  130    3.7     |  23     |  1.07   24 Aug 2018 03:27    144    |  111    |  39     ----------------------------<  99     4.2     |  21     |  1.50   23 Aug 2018 04:15    142    |  108    |  39     ----------------------------<  131    3.9     |  19     |  2.09   22 Aug 2018 14:59    140    |  103    |  31     ----------------------------<  181    4.2     |  23     |  2.19     Ca    7.6        26 Aug 2018 03:06  Ca    7.3        25 Aug 2018 03:00  Ca    7.6        24 Aug 2018 03:27  Ca    7.3        23 Aug 2018 04:15  Ca    8.3        22 Aug 2018 14:59  Phos  1.7       26 Aug 2018 03:06  Phos  0.7       25 Aug 2018 03:00  Phos  2.0       24 Aug 2018 03:27  Phos  3.3       23 Aug 2018 04:15  Mg     2.0       25 Aug 2018 03:00  Mg     2.0       24 Aug 2018 03:27  Mg     1.5       23 Aug 2018 04:15    TPro  5.7    /  Alb  1.9    /  TBili  2.6    /  DBili  1.60   /  AST  63     /  ALT  37     /  AlkPhos  74     26 Aug 2018 03:06  TPro  5.5    /  Alb  1.9    /  TBili  2.6    /  DBili  1.72   /  AST  53     /  ALT  29     /  AlkPhos  41     25 Aug 2018 03:00  TPro  6.4    /  Alb  2.5    /  TBili  3.0    /  DBili  1.62   /  AST  58     /  ALT  33     /  AlkPhos  45     24 Aug 2018 03:27  TPro  6.2    /  Alb  2.5    /  TBili  3.6    /  DBili  1.77   /  AST  59     /  ALT  36     /  AlkPhos  59     23 Aug 2018 04:15  TPro  7.0    /  Alb  2.8    /  TBili  1.5    /  DBili  x      /  AST  60     /  ALT  37     /  AlkPhos  60     22 Aug 2018 14:59      PT/INR - ( 26 Aug 2018 03:06 )   PT: 14.8 sec;   INR: 1.35 ratio                 CAPILLARY BLOOD GLUCOSE      POCT Blood Glucose.: 162 mg/dL (26 Aug 2018 11:57)  POCT Blood Glucose.: 122 mg/dL (26 Aug 2018 05:51)  POCT Blood Glucose.: 131 mg/dL (25 Aug 2018 22:49)  POCT Blood Glucose.: 127 mg/dL (25 Aug 2018 16:23)      C-Reactive Protein, Serum: 10.89 mg/dL (08-25 @ 10:26)      RADIOLOGY & ADDITIONAL TESTS:    Imaging Personally Reviewed:  [ ] YES  [ ] NO    Consultant(s) Notes Reviewed:  [ ] YES  [ ] NO    Care Discussed with Consultants/Other Providers [ ] YES  [ ] NO Patient is a 68y old  Male who presents with a chief complaint of AMS (22 Aug 2018 21:56)      HPI:  69 Y/O male w/ PMHx of Hep B, liver cirrhosis on transplant list as per family, DM, HTN, CAD w/ stents, brought in by family for AMS. As per daughter, pt has had increasing confusion, febrile, unable to put on pants this am, urinating on self, which began this morning. While in ED, pt was noted to be febrile w/ temp of 104.2, hypotensive despite 4LNS so started on levophed, confused w/ lactate of 5.8. ICU called for further evaluation. (22 Aug 2018 21:56)      INTERVAL HPI/OVERNIGHT EVENTS:  The patient denies melena, hematochezia, hematemesis, nausea, vomiting, abdominal pain, constipation, diarrhea, or change in bowel movements     MEDICATIONS  (STANDING):  cefTRIAXone   IVPB 2 Gram(s) IV Intermittent every 24 hours  cefTRIAXone   IVPB      dextrose 5%. 1000 milliLiter(s) (50 mL/Hr) IV Continuous <Continuous>  dextrose 50% Injectable 12.5 Gram(s) IV Push once  dextrose 50% Injectable 25 Gram(s) IV Push once  dextrose 50% Injectable 25 Gram(s) IV Push once  insulin lispro (HumaLOG) corrective regimen sliding scale   SubCutaneous three times a day before meals  insulin lispro (HumaLOG) corrective regimen sliding scale   SubCutaneous at bedtime  lactobacillus acidophilus 1 Tablet(s) Oral every 12 hours  midodrine 5 milliGRAM(s) Oral every 8 hours    MEDICATIONS  (PRN):  acetaminophen   Tablet 650 milliGRAM(s) Oral every 6 hours PRN For Temp greater than 38 C (100.4 F)  dextrose 40% Gel 15 Gram(s) Oral once PRN Blood Glucose LESS THAN 70 milliGRAM(s)/deciliter  glucagon  Injectable 1 milliGRAM(s) IntraMuscular once PRN Glucose LESS THAN 70 milligrams/deciliter  ibuprofen  Suspension 400 milliGRAM(s) Oral every 6 hours PRN temp> 100.4      FAMILY HISTORY:      Allergies    No Known Allergies    Intolerances        PMH/PSH:        REVIEW OF SYSTEMS: Extubated  RESPIRATORY: No cough, wheezing, chills or hemoptysis; No shortness of breath  CARDIOVASCULAR: No chest pain, palpitations, dizziness, or leg swelling  GASTROINTESTINAL: See above  GENITOURINARY: No dysuria, frequency, hematuria, or incontinence  NEUROLOGICAL: No headaches, memory loss, loss of strength, numbness, or tremors    Vital Signs Last 24 Hrs  T(C): 36.7 (26 Aug 2018 15:21), Max: 38.1 (25 Aug 2018 23:28)  T(F): 98 (26 Aug 2018 15:21), Max: 100.5 (25 Aug 2018 23:28)  HR: 77 (26 Aug 2018 13:00) (62 - 99)  BP: 135/71 (26 Aug 2018 13:00) (105/67 - 149/69)  BP(mean): 86 (26 Aug 2018 13:00) (70 - 118)  RR: 18 (26 Aug 2018 13:00) (14 - 28)  SpO2: 97% (26 Aug 2018 13:00) (93% - 100%)    PHYSICAL EXAM:  GENERAL: NAD, well-groomed, well-developed  EYES: EOMI, PERRLA, conjunctiva and sclera clear  CHEST/LUNG: Clear to percussion bilaterally; No rales, rhonchi, wheezing, or rubs  HEART: Regular rate and rhythm; No murmurs, rubs, or gallops  ABDOMEN: Soft, Nontender, Nondistended; Bowel sounds present  EXTREMITIES:  2+ Peripheral Pulses, No clubbing, cyanosis, or edema  LYMPH: No lymphadenopathy noted  SKIN: No rashes or lesions    LAB                          11.5   7.30  )-----------( 19       ( 26 Aug 2018 03:06 )             33.0       CBC:  08-26 @ 03:06  WBC 7.30   Hgb 11.5   Hct 33.0   Plts 19  MCV 94.8  08-25 @ 03:00  WBC 7.88   Hgb 10.5   Hct 30.3   Plts 16  MCV 95.6  08-24 @ 14:58  WBC 7.15   Hgb 10.0   Hct 29.3   Plts 18  MCV 96.7  08-24 @ 03:27  WBC 10.19   Hgb 12.6   Hct 39.0   Plts 11  .0  08-23 @ 04:15  WBC 21.75   Hgb 12.0   Hct 34.7   Plts 23  MCV 96.9  08-22 @ 14:59  WBC 8.34   Hgb 11.9   Hct 34.8   Plts 15  MCV 97.2      Chemistry:  08-26 @ 03:06  Na+ 141  K+ 3.7  Cl- 109  CO2 22  BUN 27  Cr 0.86     08-25 @ 03:00  Na+ 142  K+ 3.7  Cl- 110  CO2 23  BUN 35  Cr 1.07     08-24 @ 03:27  Na+ 144  K+ 4.2  Cl- 111  CO2 21  BUN 39  Cr 1.50     08-23 @ 04:15  Na+ 142  K+ 3.9  Cl- 108  CO2 19  BUN 39  Cr 2.09     08-22 @ 14:59  Na+ 140  K+ 4.2  Cl- 103  CO2 23  BUN 31  Cr 2.19         Glucose, Serum: 118 mg/dL (08-26 @ 03:06)  Glucose, Serum: 130 mg/dL (08-25 @ 03:00)  Glucose, Serum: 99 mg/dL (08-24 @ 03:27)  Glucose, Serum: 131 mg/dL (08-23 @ 04:15)  Glucose, Serum: 181 mg/dL (08-22 @ 14:59)      26 Aug 2018 03:06    141    |  109    |  27     ----------------------------<  118    3.7     |  22     |  0.86   25 Aug 2018 03:00    142    |  110    |  35     ----------------------------<  130    3.7     |  23     |  1.07   24 Aug 2018 03:27    144    |  111    |  39     ----------------------------<  99     4.2     |  21     |  1.50   23 Aug 2018 04:15    142    |  108    |  39     ----------------------------<  131    3.9     |  19     |  2.09   22 Aug 2018 14:59    140    |  103    |  31     ----------------------------<  181    4.2     |  23     |  2.19     Ca    7.6        26 Aug 2018 03:06  Ca    7.3        25 Aug 2018 03:00  Ca    7.6        24 Aug 2018 03:27  Ca    7.3        23 Aug 2018 04:15  Ca    8.3        22 Aug 2018 14:59  Phos  1.7       26 Aug 2018 03:06  Phos  0.7       25 Aug 2018 03:00  Phos  2.0       24 Aug 2018 03:27  Phos  3.3       23 Aug 2018 04:15  Mg     2.0       25 Aug 2018 03:00  Mg     2.0       24 Aug 2018 03:27  Mg     1.5       23 Aug 2018 04:15    TPro  5.7    /  Alb  1.9    /  TBili  2.6    /  DBili  1.60   /  AST  63     /  ALT  37     /  AlkPhos  74     26 Aug 2018 03:06  TPro  5.5    /  Alb  1.9    /  TBili  2.6    /  DBili  1.72   /  AST  53     /  ALT  29     /  AlkPhos  41     25 Aug 2018 03:00  TPro  6.4    /  Alb  2.5    /  TBili  3.0    /  DBili  1.62   /  AST  58     /  ALT  33     /  AlkPhos  45     24 Aug 2018 03:27  TPro  6.2    /  Alb  2.5    /  TBili  3.6    /  DBili  1.77   /  AST  59     /  ALT  36     /  AlkPhos  59     23 Aug 2018 04:15  TPro  7.0    /  Alb  2.8    /  TBili  1.5    /  DBili  x      /  AST  60     /  ALT  37     /  AlkPhos  60     22 Aug 2018 14:59      PT/INR - ( 26 Aug 2018 03:06 )   PT: 14.8 sec;   INR: 1.35 ratio                 CAPILLARY BLOOD GLUCOSE      POCT Blood Glucose.: 162 mg/dL (26 Aug 2018 11:57)  POCT Blood Glucose.: 122 mg/dL (26 Aug 2018 05:51)  POCT Blood Glucose.: 131 mg/dL (25 Aug 2018 22:49)  POCT Blood Glucose.: 127 mg/dL (25 Aug 2018 16:23)      C-Reactive Protein, Serum: 10.89 mg/dL (08-25 @ 10:26)      RADIOLOGY & ADDITIONAL TESTS:    Imaging Personally Reviewed:  [ ] YES  [ ] NO    Consultant(s) Notes Reviewed:  [ ] YES  [ ] NO    Care Discussed with Consultants/Other Providers [ ] YES  [ ] NO

## 2018-08-26 NOTE — CHART NOTE - NSCHARTNOTEFT_GEN_A_CORE
Pt medically stable for transfer to medical floor.     68M PMH of Hep B, liver cirrhosis on transplant list as per family, hepatocellular carcinoma s/p ablation, DM, HTN, CAD w/ stents, chronic thrombocytopenia (platelet count 40s), brought in by family for AMS. Admit to ICU for septic shock due to Ecoli UTI/ bacteremia, respiratory distress in setting of SIRS response requiring BiPAP for work of breathing, acute encephalopathy, ARF with ATN, acute on chronic thrombocytopenia, lactic acidosis.  ID consult called and pt to continue ceftriaxone daily.  Pt noted with low platelets, s/p 1x bag of platelets on 8/24 for platelet count of 11.  Pt currently off bipap and tolerating NC.  Pt also with baseline mental status.  Pt off pressors for 2 days.  Midodrine titrated down to 5mg tid. Pt medically stable for transfer to medical floor.  Signed out to Dr. Drake    68M PMH of Hep B, liver cirrhosis on transplant list as per family, hepatocellular carcinoma s/p ablation, DM, HTN, CAD w/ stents, chronic thrombocytopenia (platelet count 40s), brought in by family for AMS. Admit to ICU for septic shock due to Ecoli UTI/ bacteremia, respiratory distress in setting of SIRS response requiring BiPAP for work of breathing, acute encephalopathy, ARF with ATN, acute on chronic thrombocytopenia, lactic acidosis.  ID consult called and pt to continue ceftriaxone daily.  Pt noted with low platelets, s/p 1x bag of platelets on 8/24 for platelet count of 11.  Pt currently off bipap and tolerating NC.  Pt also with baseline mental status.  Pt off pressors for 2 days.  Midodrine titrated down to 5mg tid.

## 2018-08-26 NOTE — PROGRESS NOTE ADULT - ASSESSMENT
HPI:  69 Y/O male w/ PMHx of Hep B, liver cirrhosis on transplant list as per family, DM, HTN, CAD w/ stents, brought in by family for AMS. As per daughter, pt has had increasing confusion, febrile, unable to put on pants this am, urinating on self, which began this morning. While in ED, pt was noted to be febrile w/ temp of 104.2, hypotensive despite 4LNS so started on levophed, confused w/ lactate of 5.8. ICU called for further evaluation. (22 Aug 2018 21:56)  ------------------------------------ As Above ----------------------------------------------------------------------------------------------  Hospital stay complicated by gram negative bacteremia. Patient  / wife state as above. Patient  denies melena, hematochezia, hematemesis, nausea, vomiting, abdominal pain, constipation, diarrhea, or change in bowel movements   History of cirrhosis on non selective B blocker   ---------  Doubt symptoms secondary to cirrhosis. If etiology of the bacteremia is unknown , would order ultrasound to evaluate for ascites and biliary disease. Continue non selective B Blocker. Refrain from liver toxic medications if possible.  --------- Tried calling daughter [phone] but number does not ring.

## 2018-08-26 NOTE — PROGRESS NOTE ADULT - SUBJECTIVE AND OBJECTIVE BOX
# CC: AMS    ## HPI:  68M PMH of Hep B, liver cirrhosis on transplant list as per family, hepatocellular carcinoma s/p ablation, DM, HTN, CAD w/ stents, chronic thrombocytopenia (platelet count 40s), brought in by family for AMS. As per daughter, pt has had increasing confusion on day of presentation with fever, and urinated on self. At baseline pt awake, alert, oriented. Temp in .2, hypotensive despite 4LNS,  started on levophed, confused w/ lactate of 5.8. Admit to ICU for severe sepsis, septic shock, UTI, r/o bacteremia, respiratory distress in setting of SIRS response requiring BiPAP for work of breathing, acute encephalopathy, ARF with ATN    **O/N:**    ## ROS:      ## Labs:  CBC:                        11.5   7.30  )-----------( 19       ( 26 Aug 2018 03:06 )             33.0       Chem:  08-26    141  |  109<H>  |  27<H>  ----------------------------<  118<H>  3.7   |  22  |  0.86    Ca    7.6<L>      26 Aug 2018 03:06  Phos  1.7     08-26  Mg     2.0     08-25    TPro  5.7<L>  /  Alb  1.9<L>  /  TBili  2.6<H>  /  DBili  1.60<H>  /  AST  63<H>  /  ALT  37  /  AlkPhos  74  08-26      Coags:  PT/INR - ( 26 Aug 2018 03:06 )   PT: 14.8 sec;   INR: 1.35 ratio      POCT Blood Glucose.: 227 mg/dL (26 Aug 2018 16:46)  POCT Blood Glucose.: 162 mg/dL (26 Aug 2018 11:57)  POCT Blood Glucose.: 122 mg/dL (26 Aug 2018 05:51)  POCT Blood Glucose.: 131 mg/dL (25 Aug 2018 22:49)    Culture - Urine (collected 23 Aug 2018 00:25)    >100,000 CFU/ml Escherichia coli  Organism: Escherichia coli (24 Aug 2018 17:58)      -  Amikacin: S <=8      -  Amoxicillin/Clavulanic Acid: S <=8/4      -  Ampicillin: S 4 These ampicillin results predict results for amoxicillin      -  Ampicillin/Sulbactam: S <=4/2      -  Aztreonam: S <=4      -  Cefazolin: S <=2       -  Cefepime: S <=2      -  Cefoxitin: S <=4      -  Ceftriaxone: S <=1       -  Ciprofloxacin: S <=0.5      -  Ertapenem: S <=0.5      -  Gentamicin: S <=1      -  Imipenem: S <=1      -  Levofloxacin: S <=1      -  Meropenem: S <=1      -  Nitrofurantoin: S <=32 Should not be used to treat pyelonephritis      -  Piperacillin/Tazobactam: S <=8      -  Tigecycline: S <=1      -  Tobramycin: S <=2      -  Trimethoprim/Sulfamethoxazole: S <=0.5/9.5      Method Type: TEENA    Culture - Blood (collected 22 Aug 2018 18:00)    Growth in aerobic and anaerobic bottles: Escherichia coli    See previous culture [id number]    Culture - Blood (collected 22 Aug 2018 18:00)    Growth in aerobic and anaerobic bottles: Escherichia coli      -  Amikacin: S <=8      -  Ampicillin: S <=2 These ampicillin results predict results for amoxicillin      -  Ampicillin/Sulbactam: S <=4/2      -  Aztreonam: S <=4      -  Cefazolin: S <=2      -  Cefepime: S <=2      -  Cefoxitin: S <=4      -  Ceftriaxone: S <=1 Enterobacter, Citrobacter, and Serratia may develop resistance during prolonged therapy      -  Ciprofloxacin: S <=0.5      -  Ertapenem: S <=0.5      -  Gentamicin: S 2      -  Imipenem: S <=1      -  Levofloxacin: S <=1      -  Meropenem: S <=1      -  Piperacillin/Tazobactam: S <=8      -  Tobramycin: S <=2      -  Trimethoprim/Sulfamethoxazole: S <=0.5/9.5      Method Type: TEENA    ## Imaging:    ## Meds:  midodrine 5 milliGRAM(s) Oral every 8 hours    cefTRIAXone   IVPB 2 Gram(s) IV Intermittent every 24 hours  lactobacillus acidophilus 1 Tablet(s) Oral every 12 hours    acetaminophen   Tablet 650 milliGRAM(s) Oral every 6 hours PRN  ibuprofen  Suspension 400 milliGRAM(s) Oral every 6 hours PRN    insulin lispro (HumaLOG) corrective regimen sliding scale   SubCutaneous three times a day before meals  insulin lispro (HumaLOG) corrective regimen sliding scale   SubCutaneous at bedtime    ## O/E:  T(C): 36.7 (26 Aug 2018 15:21), Max: 38.1 (25 Aug 2018 23:28)  HR: 76 (26 Aug 2018 16:00) (65 - 99)  BP: 127/69 (26 Aug 2018 16:00) (111/49 - 149/69)  BP(mean): 84 (26 Aug 2018 16:00) (65 - 118)  RR: 15 (26 Aug 2018 16:00) (14 - 31)  SpO2: 94% (26 Aug 2018 16:00) (93% - 100%)  IN: 2940 mL / OUT: 1065 mL / NET: 1875 mL    Gen: lying comfortably in bed in no apparent distress  HEENT: PERRL, mildly icteric  Resp: CTA B/L no c/r/w  CVS: S1S2 no m/r/g  Abd: firmly distended NT / +BS  Ext: no c/c/e  Neuro: alert and responsive    ## Daily Issues  - Analgesia: N/A  - Sedation: N/A  - HOB elevation: Y  - GI ppx (ulcers): N/A  - DVT ppx: Hep SC  - Nutrition: PO diet  - Central line: N  - Burton: N    ## Assessment / Plan:  68M    ## Code status:  Goals of care discussion: [x] yes [ ] no  [x] full code  [ ] DNR  [ ] DNI  [ ] MOLST # CC: AMS    ## HPI:  68M PMH of Hep B, liver cirrhosis on transplant list as per family, hepatocellular carcinoma s/p ablation, DM, HTN, CAD w/ stents, chronic thrombocytopenia (platelet count 40s), brought in by family for AMS. As per daughter, pt has had increasing confusion on day of presentation with fever, and urinated on self. At baseline pt awake, alert, oriented. Temp in .2, hypotensive despite 4LNS,  started on levophed, confused w/ lactate of 5.8. Admit to ICU for severe sepsis, septic shock, UTI, r/o bacteremia, respiratory distress in setting of SIRS response requiring BiPAP for work of breathing, acute encephalopathy, ARF with ATN    **O/N:**  Borderline fever    ## ROS:  Unobtainable due to language difficulties / somnolence    ## Labs:  CBC:                        11.5   7.30  )-----------( 19       ( 26 Aug 2018 03:06 )             33.0       Chem:  08-26    141  |  109<H>  |  27<H>  ----------------------------<  118<H>  3.7   |  22  |  0.86    Ca    7.6<L>      26 Aug 2018 03:06  Phos  1.7     08-26  Mg     2.0     08-25    TPro  5.7<L>  /  Alb  1.9<L>  /  TBili  2.6<H>  /  DBili  1.60<H>  /  AST  63<H>  /  ALT  37  /  AlkPhos  74  08-26      Coags:  PT/INR - ( 26 Aug 2018 03:06 )   PT: 14.8 sec;   INR: 1.35 ratio      POCT Blood Glucose.: 227 mg/dL (26 Aug 2018 16:46)  POCT Blood Glucose.: 162 mg/dL (26 Aug 2018 11:57)  POCT Blood Glucose.: 122 mg/dL (26 Aug 2018 05:51)  POCT Blood Glucose.: 131 mg/dL (25 Aug 2018 22:49)    Culture - Urine (collected 23 Aug 2018 00:25)    >100,000 CFU/ml Escherichia coli  Organism: Escherichia coli (24 Aug 2018 17:58)      -  Amikacin: S <=8      -  Amoxicillin/Clavulanic Acid: S <=8/4      -  Ampicillin: S 4 These ampicillin results predict results for amoxicillin      -  Ampicillin/Sulbactam: S <=4/2      -  Aztreonam: S <=4      -  Cefazolin: S <=2       -  Cefepime: S <=2      -  Cefoxitin: S <=4      -  Ceftriaxone: S <=1       -  Ciprofloxacin: S <=0.5      -  Ertapenem: S <=0.5      -  Gentamicin: S <=1      -  Imipenem: S <=1      -  Levofloxacin: S <=1      -  Meropenem: S <=1      -  Nitrofurantoin: S <=32 Should not be used to treat pyelonephritis      -  Piperacillin/Tazobactam: S <=8      -  Tigecycline: S <=1      -  Tobramycin: S <=2      -  Trimethoprim/Sulfamethoxazole: S <=0.5/9.5      Method Type: TEENA    Culture - Blood (collected 22 Aug 2018 18:00)    Growth in aerobic and anaerobic bottles: Escherichia coli    See previous culture [id number]    Culture - Blood (collected 22 Aug 2018 18:00)    Growth in aerobic and anaerobic bottles: Escherichia coli      -  Amikacin: S <=8      -  Ampicillin: S <=2 These ampicillin results predict results for amoxicillin      -  Ampicillin/Sulbactam: S <=4/2      -  Aztreonam: S <=4      -  Cefazolin: S <=2      -  Cefepime: S <=2      -  Cefoxitin: S <=4      -  Ceftriaxone: S <=1       -  Ciprofloxacin: S <=0.5      -  Ertapenem: S <=0.5      -  Gentamicin: S 2      -  Imipenem: S <=1      -  Levofloxacin: S <=1      -  Meropenem: S <=1      -  Piperacillin/Tazobactam: S <=8      -  Tobramycin: S <=2      -  Trimethoprim/Sulfamethoxazole: S <=0.5/9.5      Method Type: TEENA    ## Imaging: No new imaging    ## Meds:  midodrine 5 milliGRAM(s) Oral every 8 hours    cefTRIAXone   IVPB 2 Gram(s) IV Intermittent every 24 hours  lactobacillus acidophilus 1 Tablet(s) Oral every 12 hours    acetaminophen   Tablet 650 milliGRAM(s) Oral every 6 hours PRN  ibuprofen  Suspension 400 milliGRAM(s) Oral every 6 hours PRN    insulin lispro (HumaLOG) corrective regimen sliding scale   SubCutaneous three times a day before meals  insulin lispro (HumaLOG) corrective regimen sliding scale   SubCutaneous at bedtime    ## O/E:  T(C): 36.7 (26 Aug 2018 15:21), Max: 38.1 (25 Aug 2018 23:28)  HR: 76 (26 Aug 2018 16:00) (65 - 99)  BP: 127/69 (26 Aug 2018 16:00) (111/49 - 149/69)  BP(mean): 84 (26 Aug 2018 16:00) (65 - 118)  RR: 15 (26 Aug 2018 16:00) (14 - 31)  SpO2: 94% (26 Aug 2018 16:00) (93% - 100%)  IN: 2940 mL / OUT: 1065 mL / NET: 1875 mL    Gen: lying comfortably in bed in no apparent distress  HEENT: PERRL, mildly icteric  Resp: CTA B/L no c/r/w  CVS: S1S2 no m/r/g  Abd: firmly distended NT / +BS  Ext: no c/c/e  Neuro: alert and responsive    ## Daily Issues  - Analgesia: N/A  - Sedation: N/A  - HOB elevation: Y  - GI ppx (ulcers): N/A  - DVT ppx: Hep SC  - Nutrition: PO diet    ## Assessment / Plan:  68M with E. coli bacteremia and bacteruria a/w septic shock  - Off pressors  - Remains on midodrine, continue current dose for now hold for hypertension  - Blood cultures and urine positive pan-sensitive E. coli, on ceftriaxone.    * Repeat blood cultures to confirm clearance    * Will need 14 days of antibiotics for bacteremia  - Hepatic failure: [id number], MELD 13  - Full liquid diet per patient preferences    ## Code status:  Goals of care discussion: [x] yes [ ] no  [x] full code  [ ] DNR  [ ] DNI  [ ] MOLST    Can transfer to general medicine.

## 2018-08-26 NOTE — PROGRESS NOTE ADULT - PROBLEM SELECTOR PLAN 1
Spoke with patient's daughter. The etiology of cirrhosis is unclear. She was never told that her father had hepatitis
Spoke with patient's daughter. The etiology of cirrhosis is unclear. She was never told that her father had hepatitis

## 2018-08-26 NOTE — PROGRESS NOTE ADULT - PROBLEM SELECTOR PLAN 3
See sonogram. ( stones, non specific edema ) HIDA ?
Tmax 100.5 See sonogram. ( stones, non specific edema ) HIDA ?

## 2018-08-26 NOTE — PROGRESS NOTE ADULT - PROBLEM SELECTOR PLAN 2
History of liver cancer s/p ablation 11/2017. See sonogram. AFP ordered.  MRI ?
History of liver cancer s/p ablation 11/2017. See sonogram. AFP normal

## 2018-08-27 LAB
ALBUMIN SERPL ELPH-MCNC: 1.8 G/DL — LOW (ref 3.3–5)
ALP SERPL-CCNC: 73 U/L — SIGNIFICANT CHANGE UP (ref 40–120)
ALT FLD-CCNC: 34 U/L — SIGNIFICANT CHANGE UP (ref 12–78)
ANION GAP SERPL CALC-SCNC: 9 MMOL/L — SIGNIFICANT CHANGE UP (ref 5–17)
AST SERPL-CCNC: 38 U/L — HIGH (ref 15–37)
BILIRUB DIRECT SERPL-MCNC: 1.01 MG/DL — HIGH (ref 0.05–0.2)
BILIRUB INDIRECT FLD-MCNC: 1 MG/DL — SIGNIFICANT CHANGE UP (ref 0.2–1)
BILIRUB SERPL-MCNC: 2 MG/DL — HIGH (ref 0.2–1.2)
BUN SERPL-MCNC: 24 MG/DL — HIGH (ref 7–23)
CALCIUM SERPL-MCNC: 7.8 MG/DL — LOW (ref 8.5–10.1)
CHLORIDE SERPL-SCNC: 108 MMOL/L — SIGNIFICANT CHANGE UP (ref 96–108)
CO2 SERPL-SCNC: 24 MMOL/L — SIGNIFICANT CHANGE UP (ref 22–31)
CREAT SERPL-MCNC: 0.82 MG/DL — SIGNIFICANT CHANGE UP (ref 0.5–1.3)
CULTURE RESULTS: SIGNIFICANT CHANGE UP
GLUCOSE BLDC GLUCOMTR-MCNC: 116 MG/DL — HIGH (ref 70–99)
GLUCOSE SERPL-MCNC: 145 MG/DL — HIGH (ref 70–99)
GRAM STN FLD: SIGNIFICANT CHANGE UP
HBV E AB SER-ACNC: POSITIVE
HBV E AG SER-ACNC: NEGATIVE — SIGNIFICANT CHANGE UP
HCT VFR BLD CALC: 30.7 % — LOW (ref 39–50)
HGB BLD-MCNC: 10.7 G/DL — LOW (ref 13–17)
INR BLD: 1.36 RATIO — HIGH (ref 0.88–1.16)
LACTATE SERPL-SCNC: 1.3 MMOL/L — SIGNIFICANT CHANGE UP (ref 0.7–2)
MAGNESIUM SERPL-MCNC: 1.9 MG/DL — SIGNIFICANT CHANGE UP (ref 1.6–2.6)
MCHC RBC-ENTMCNC: 32.7 PG — SIGNIFICANT CHANGE UP (ref 27–34)
MCHC RBC-ENTMCNC: 34.9 GM/DL — SIGNIFICANT CHANGE UP (ref 32–36)
MCV RBC AUTO: 93.9 FL — SIGNIFICANT CHANGE UP (ref 80–100)
NRBC # BLD: 0 /100 WBCS — SIGNIFICANT CHANGE UP (ref 0–0)
PHOSPHATE SERPL-MCNC: 2.2 MG/DL — LOW (ref 2.5–4.5)
PLATELET # BLD AUTO: 18 K/UL — CRITICAL LOW (ref 150–400)
POTASSIUM SERPL-MCNC: 3.5 MMOL/L — SIGNIFICANT CHANGE UP (ref 3.5–5.3)
POTASSIUM SERPL-SCNC: 3.5 MMOL/L — SIGNIFICANT CHANGE UP (ref 3.5–5.3)
PROT SERPL-MCNC: 5.6 GM/DL — LOW (ref 6–8.3)
PROTHROM AB SERPL-ACNC: 14.9 SEC — HIGH (ref 9.8–12.7)
RBC # BLD: 3.27 M/UL — LOW (ref 4.2–5.8)
RBC # FLD: 14.8 % — HIGH (ref 10.3–14.5)
SODIUM SERPL-SCNC: 141 MMOL/L — SIGNIFICANT CHANGE UP (ref 135–145)
SPECIMEN SOURCE: SIGNIFICANT CHANGE UP
WBC # BLD: 6.85 K/UL — SIGNIFICANT CHANGE UP (ref 3.8–10.5)
WBC # FLD AUTO: 6.85 K/UL — SIGNIFICANT CHANGE UP (ref 3.8–10.5)

## 2018-08-27 PROCEDURE — 99233 SBSQ HOSP IP/OBS HIGH 50: CPT

## 2018-08-27 RX ORDER — POLYETHYLENE GLYCOL 3350 17 G/17G
17 POWDER, FOR SOLUTION ORAL DAILY
Qty: 0 | Refills: 0 | Status: DISCONTINUED | OUTPATIENT
Start: 2018-08-27 | End: 2018-09-01

## 2018-08-27 RX ORDER — DOCUSATE SODIUM 100 MG
100 CAPSULE ORAL
Qty: 0 | Refills: 0 | Status: DISCONTINUED | OUTPATIENT
Start: 2018-08-27 | End: 2018-09-01

## 2018-08-27 RX ORDER — SODIUM,POTASSIUM PHOSPHATES 278-250MG
1 POWDER IN PACKET (EA) ORAL
Qty: 0 | Refills: 0 | Status: COMPLETED | OUTPATIENT
Start: 2018-08-27 | End: 2018-08-28

## 2018-08-27 RX ADMIN — Medication 1 TABLET(S): at 10:52

## 2018-08-27 RX ADMIN — Medication 1 TABLET(S): at 13:26

## 2018-08-27 RX ADMIN — Medication 1 TABLET(S): at 16:35

## 2018-08-27 RX ADMIN — MIDODRINE HYDROCHLORIDE 5 MILLIGRAM(S): 2.5 TABLET ORAL at 05:13

## 2018-08-27 RX ADMIN — Medication 1 TABLET(S): at 21:47

## 2018-08-27 RX ADMIN — Medication 1 TABLET(S): at 05:13

## 2018-08-27 RX ADMIN — Medication 2: at 16:35

## 2018-08-27 RX ADMIN — MIDODRINE HYDROCHLORIDE 5 MILLIGRAM(S): 2.5 TABLET ORAL at 21:47

## 2018-08-27 RX ADMIN — CEFTRIAXONE 100 GRAM(S): 500 INJECTION, POWDER, FOR SOLUTION INTRAMUSCULAR; INTRAVENOUS at 13:26

## 2018-08-27 NOTE — PROGRESS NOTE ADULT - ASSESSMENT
68M PMH of Hep B, liver cirrhosis on transplant list as per family, hepatocellular carcinoma s/p ablation, DM, HTN, CAD w/ stents, chronic thrombocytopenia (platelet count 40s), brought in by family for AMS. Admit to ICU for septic shock due to Ecoli UTI/ bacteremia, respiratory distress in setting of SIRS response requiring BiPAP for work of breathing, acute encephalopathy, ARF with ATN, acute on chronic thrombocytopenia, lactic acidosis.  ID consult called and pt to continue ceftriaxone daily.  Pt noted with low platelets, s/p 1x bag of platelets on 8/24 for platelet count of 11.  Pt currently off bipap and tolerating NC.  Pt also with baseline mental status.  Pt off pressors    - Off pressors  tapering  midodrine, continue current dose for now hold for hypertension  - Blood cultures and urine positive pan-sensitive E. coli, on ceftriaxone.    * Repeat blood cultures gram positive, awaiting final     * Will need 14 days of antibiotics for bacteremia  - Hepatic failure: [id number], MELD 13, gi following    ID on board    monitoring thrombocytopenia no active bleeding

## 2018-08-27 NOTE — PROVIDER CONTACT NOTE (CRITICAL VALUE NOTIFICATION) - SITUATION
Received call from lab with platelet level 18
previous positive blood cultures  pt on IV antibiotics - afebrile at this time

## 2018-08-27 NOTE — PROGRESS NOTE ADULT - SUBJECTIVE AND OBJECTIVE BOX
TMAX - 99.6    On day # 5 Rocephin ( total ab rx ) / s/p Gent x 3 doses    Vital Signs Last 24 Hrs  T(C): 36.5 (27 Aug 2018 17:00), Max: 37 (26 Aug 2018 19:16)  T(F): 97.7 (27 Aug 2018 17:00), Max: 98.6 (26 Aug 2018 19:16)  HR: 75 (27 Aug 2018 17:00) (72 - 82)  BP: 129/74 (27 Aug 2018 17:00) (110/62 - 141/77)  BP(mean): 74 (26 Aug 2018 23:48) (70 - 88)  RR: 17 (27 Aug 2018 17:00) (17 - 28)  SpO2: 96% (27 Aug 2018 17:00) (93% - 97%)  Supplemental O2:  on RA now      Awake, alert, transferred out of the ICU now to .  No c/o cp, no SOB, no abdominal pain, and no c/o back pain or difficulty voiding.  Feels much better now.  Tolerating a po diet.      PHYSICAL EXAM  General:  awake, alert, lying in bed  in semi-upright position, pleasant and cooperative, in NAD  HEENT:  conj pink, sclerae anicteric now, PERRLA, no oral lesions noted  Neck:  supple, no nodes noted  Heart:  RR  Lungs:  clear bilaterally  Abdomen:  soft, BS+, nontender to palpation  No CVA or Spinal tenderness noted  Extremities:  no edema LE's                     no calf tenderness noted  Skin:  warm, dry, no rash noted      I&O's Summary :    26 Aug 2018 07:01  -  27 Aug 2018 07:00  --------------------------------------------------------  IN: 1430 mL / OUT: 1750 mL / NET: -320 mL    27 Aug 2018 07:01  -  27 Aug 2018 17:47  --------------------------------------------------------  IN: 540 mL / OUT: 0 mL / NET: 540 mL        LABS:  CBC Full  -  ( 27 Aug 2018 06:19 )  WBC Count : 6.85 K/uL  Hemoglobin : 10.7 g/dL  Hematocrit : 30.7 %  Platelet Count - Automated : 18 K/uL  Mean Cell Volume : 93.9 fl  Mean Cell Hemoglobin : 32.7 pg  Mean Cell Hemoglobin Concentration : 34.9 gm/dL  Auto Neutrophil # : x  Auto Lymphocyte # : x  Auto Monocyte # : x  Auto Eosinophil # : x  Auto Basophil # : x  Auto Neutrophil % : x  Auto Lymphocyte % : x  Auto Monocyte % : x  Auto Eosinophil % : x  Auto Basophil % : x    08-27    141  |  108  |  24<H>  ----------------------------<  145<H>  3.5   |  24  |  0.82    Ca    7.8<L>      27 Aug 2018 06:19  Phos  2.2     08-27  Mg     1.9     08-27    TPro  5.6<L>  /  Alb  1.8<L>  /  TBili  2.0<H>  /  DBili  1.01<H>  /  AST  38<H>  /  ALT  34  /  AlkPhos  73  08-27    LIVER FUNCTIONS - ( 27 Aug 2018 06:19 )  Alb: 1.8 g/dL / Pro: 5.6 gm/dL / ALK PHOS: 73 U/L / ALT: 34 U/L / AST: 38 U/L / GGT: x             PT/INR - ( 27 Aug 2018 06:19 )   PT: 14.9 sec;   INR: 1.36 ratio         Sedimentation Rate, Erythrocyte: 50 mm/hr (08-25 @ 03:00)      Gentamicin Level, Trough: 1.4 ug/mL (08-25 @ 10:26)        MICROBIOLOGY:  Specimen Source: .Blood Blood-Peripheral (08-26 @ 11:39)  Culture Results:   No growth to date. (08-26 @ 11:39)    Specimen Source: .Blood Blood-Peripheral (08-26 @ 11:39)  Culture Results:   Growth in anaerobic bottle: Gram Positive Rods (08-26 @ 11:39)    Specimen Source: .Urine Clean Catch (Midstream) (08-23 @ 00:25)  Culture Results:   >100,000 CFU/ml Escherichia coli (08-23 @ 00:25)    Specimen Source: .Blood Blood (08-22 @ 18:00)  Culture Results:   Growth in aerobic and anaerobic bottles: Escherichia coli  Culture - Blood (08.22.18 @ 18:00)    -  Escherichia coli: Detec    -  Trimethoprim/Sulfamethoxazole: S <=0.5/9.5    -  Tobramycin: S <=2    -  Piperacillin/Tazobactam: S <=8    -  Meropenem: S <=1    -  Levofloxacin: S <=1    -  Imipenem: S <=1    -  Gentamicin: S 2    Gram Stain:   Growth in anaerobic bottle: Gram Negative Rods  Growth in aerobic bottle: Gram Negative Rods    -  Amikacin: S <=8    -  Ampicillin/Sulbactam: S <=4/2    -  Ampicillin: S <=2 These ampicillin results predict results for amoxicillin    -  Cefoxitin: S <=4    -  Cefepime: S <=2    -  Cefazolin: S <=2    -  Aztreonam: S <=4    -  Ertapenem: S <=0.5    -  Ciprofloxacin: S <=0.5    -  Ceftriaxone: S <=1 Enterobacter, Citrobacter, and Serratia may develop resistance during prolonged therapy          Specimen Source: .Blood Blood (08-22 @ 18:00)  Culture Results:   Growth in aerobic and anaerobic bottles: Escherichia coli  See previous culture [id number] (08-22 @ 18:00)          Legionella Antigen, Urine: Negative (08-23 @ 12:29)        Impression:   Slowly improving clinically on present ab rx with Rocephin now for rx of Sepsis with initial Shock secondary to EColi Bacteremia with  EColi UTI as a source.  WBC's are WNL and noted Platelets remain low secondary to underlying Chronic Liver Disease/ Cirrhosis with Hepatocellular Ca reported by hx and patient is awaiting a Liver Transplant..  Noted now that 1 bottle out of 2 sets of repeat Blood Cx's drawn on 8/26 is now Positive for a Gm Positive Edmund - ? contaminant??      Suggestions:  Will therefore continue present ab rx with Rocephin and follow-up temps and labs closely.  Await further Blood Cx results.  Discussed with patient and his wife at bedside.

## 2018-08-27 NOTE — PROVIDER CONTACT NOTE (CRITICAL VALUE NOTIFICATION) - NAME OF MD/NP/PA/DO NOTIFIED:
DONNA Iqbal
Dr. Pete MALDONADO
JANET Marin
JANET Marin
JANET Millan
JANET Schroeder
JANET jimenez
Linden RN, Dr Camara
RAVINDRA Riley
isabell ley
Isatu Lai NP

## 2018-08-27 NOTE — PROGRESS NOTE ADULT - SUBJECTIVE AND OBJECTIVE BOX
Patient is a 68y old  Male who presents with a chief complaint of AMS (22 Aug 2018 21:56)      INTERVAL HPI/OVERNIGHT EVENTS: no events overnight     MEDICATIONS  (STANDING):  cefTRIAXone   IVPB 2 Gram(s) IV Intermittent every 24 hours  cefTRIAXone   IVPB      dextrose 5%. 1000 milliLiter(s) (50 mL/Hr) IV Continuous <Continuous>  dextrose 50% Injectable 12.5 Gram(s) IV Push once  dextrose 50% Injectable 25 Gram(s) IV Push once  dextrose 50% Injectable 25 Gram(s) IV Push once  docusate sodium 100 milliGRAM(s) Oral two times a day  insulin lispro (HumaLOG) corrective regimen sliding scale   SubCutaneous three times a day before meals  insulin lispro (HumaLOG) corrective regimen sliding scale   SubCutaneous at bedtime  lactobacillus acidophilus 1 Tablet(s) Oral every 12 hours  midodrine 5 milliGRAM(s) Oral every 8 hours  potassium acid phosphate/sodium acid phosphate tablet (K-PHOS No. 2) 1 Tablet(s) Oral four times a day with meals    MEDICATIONS  (PRN):  acetaminophen   Tablet 650 milliGRAM(s) Oral every 6 hours PRN For Temp greater than 38 C (100.4 F)  dextrose 40% Gel 15 Gram(s) Oral once PRN Blood Glucose LESS THAN 70 milliGRAM(s)/deciliter  glucagon  Injectable 1 milliGRAM(s) IntraMuscular once PRN Glucose LESS THAN 70 milligrams/deciliter  ibuprofen  Suspension 400 milliGRAM(s) Oral every 6 hours PRN temp> 100.4  polyethylene glycol 3350 17 Gram(s) Oral daily PRN Constipation      Allergies    No Known Allergies    Intolerances           Vital Signs Last 24 Hrs  T(C): 36.9 (27 Aug 2018 12:07), Max: 37 (26 Aug 2018 19:16)  T(F): 98.4 (27 Aug 2018 12:07), Max: 98.6 (26 Aug 2018 19:16)  HR: 75 (27 Aug 2018 12:07) (72 - 88)  BP: 139/76 (27 Aug 2018 12:07) (110/62 - 140/69)  BP(mean): 74 (26 Aug 2018 23:48) (65 - 91)  RR: 18 (27 Aug 2018 12:07) (15 - 31)  SpO2: 95% (27 Aug 2018 12:07) (92% - 97%)    PHYSICAL EXAM:  GENERAL: NAD, well-groomed, well-developed  HEAD:  Atraumatic, Normocephalic  EYES: EOMI, PERRLA, icteric   ENMT: No tonsillar erythema, exudates, or enlargement; Moist mucous membranes, Good dentition, No lesions  NECK: Supple, No JVD, Normal thyroid  NERVOUS SYSTEM:  Alert & Oriented X3, Good concentration; Motor Strength 5/5 B/L upper and lower extremities; DTRs 2+ intact and symmetric  CHEST/LUNG: Clear to percussion bilaterally; No rales, rhonchi, wheezing, or rubs  HEART: Regular rate and rhythm; No murmurs, rubs, or gallops  ABDOMEN: Soft, Nontender, Nondistended; Bowel sounds present  EXTREMITIES:  2+ Peripheral Pulses, No clubbing, cyanosis, or edema  LYMPH: No lymphadenopathy noted       LABS:                        10.7   6.85  )-----------( 18       ( 27 Aug 2018 06:19 )             30.7     08-27    141  |  108  |  24<H>  ----------------------------<  145<H>  3.5   |  24  |  0.82    Ca    7.8<L>      27 Aug 2018 06:19  Phos  2.2     08-27  Mg     1.9     08-27    TPro  5.6<L>  /  Alb  1.8<L>  /  TBili  2.0<H>  /  DBili  1.01<H>  /  AST  38<H>  /  ALT  34  /  AlkPhos  73  08-27    PT/INR - ( 27 Aug 2018 06:19 )   PT: 14.9 sec;   INR: 1.36 ratio             CAPILLARY BLOOD GLUCOSE      POCT Blood Glucose.: 126 mg/dL (27 Aug 2018 12:21)  POCT Blood Glucose.: 116 mg/dL (27 Aug 2018 08:31)  POCT Blood Glucose.: 182 mg/dL (26 Aug 2018 21:28)  POCT Blood Glucose.: 227 mg/dL (26 Aug 2018 16:46)      RADIOLOGY & ADDITIONAL TESTS:    Imaging Personally Reviewed:  [ X] YES  [ ] NO    Consultant(s) Notes Reviewed:  [ X] YES  [ ] NO    Care Discussed with Consultants/Other Providers [X ] YES  [ ] NO

## 2018-08-28 LAB
ANION GAP SERPL CALC-SCNC: 11 MMOL/L — SIGNIFICANT CHANGE UP (ref 5–17)
BUN SERPL-MCNC: 22 MG/DL — SIGNIFICANT CHANGE UP (ref 7–23)
CALCIUM SERPL-MCNC: 7.9 MG/DL — LOW (ref 8.5–10.1)
CHLORIDE SERPL-SCNC: 106 MMOL/L — SIGNIFICANT CHANGE UP (ref 96–108)
CO2 SERPL-SCNC: 22 MMOL/L — SIGNIFICANT CHANGE UP (ref 22–31)
CREAT SERPL-MCNC: 0.79 MG/DL — SIGNIFICANT CHANGE UP (ref 0.5–1.3)
CRP SERPL-MCNC: 5.91 MG/DL — HIGH (ref 0–0.4)
ERYTHROCYTE [SEDIMENTATION RATE] IN BLOOD: 44 MM/HR — HIGH (ref 0–20)
GLUCOSE SERPL-MCNC: 129 MG/DL — HIGH (ref 70–99)
HCT VFR BLD CALC: 34.3 % — LOW (ref 39–50)
HGB BLD-MCNC: 11.8 G/DL — LOW (ref 13–17)
INR BLD: 1.39 RATIO — HIGH (ref 0.88–1.16)
MCHC RBC-ENTMCNC: 31.8 PG — SIGNIFICANT CHANGE UP (ref 27–34)
MCHC RBC-ENTMCNC: 34.4 GM/DL — SIGNIFICANT CHANGE UP (ref 32–36)
MCV RBC AUTO: 92.5 FL — SIGNIFICANT CHANGE UP (ref 80–100)
NRBC # BLD: 0 /100 WBCS — SIGNIFICANT CHANGE UP (ref 0–0)
PLATELET # BLD AUTO: 33 K/UL — LOW (ref 150–400)
POTASSIUM SERPL-MCNC: 3.6 MMOL/L — SIGNIFICANT CHANGE UP (ref 3.5–5.3)
POTASSIUM SERPL-SCNC: 3.6 MMOL/L — SIGNIFICANT CHANGE UP (ref 3.5–5.3)
PROTHROM AB SERPL-ACNC: 15.3 SEC — HIGH (ref 9.8–12.7)
RBC # BLD: 3.71 M/UL — LOW (ref 4.2–5.8)
RBC # FLD: 14.6 % — HIGH (ref 10.3–14.5)
SODIUM SERPL-SCNC: 139 MMOL/L — SIGNIFICANT CHANGE UP (ref 135–145)
WBC # BLD: 8.76 K/UL — SIGNIFICANT CHANGE UP (ref 3.8–10.5)
WBC # FLD AUTO: 8.76 K/UL — SIGNIFICANT CHANGE UP (ref 3.8–10.5)

## 2018-08-28 PROCEDURE — 99233 SBSQ HOSP IP/OBS HIGH 50: CPT

## 2018-08-28 RX ADMIN — Medication 2: at 11:04

## 2018-08-28 RX ADMIN — MIDODRINE HYDROCHLORIDE 5 MILLIGRAM(S): 2.5 TABLET ORAL at 05:16

## 2018-08-28 RX ADMIN — CEFTRIAXONE 100 GRAM(S): 500 INJECTION, POWDER, FOR SOLUTION INTRAMUSCULAR; INTRAVENOUS at 11:35

## 2018-08-28 RX ADMIN — Medication 2: at 15:53

## 2018-08-28 RX ADMIN — Medication 100 MILLIGRAM(S): at 17:05

## 2018-08-28 RX ADMIN — Medication 1 TABLET(S): at 05:16

## 2018-08-28 RX ADMIN — Medication 1 TABLET(S): at 07:37

## 2018-08-28 RX ADMIN — Medication 100 MILLIGRAM(S): at 05:16

## 2018-08-28 RX ADMIN — Medication 1 TABLET(S): at 17:05

## 2018-08-28 NOTE — PHYSICAL THERAPY INITIAL EVALUATION ADULT - CRITERIA FOR SKILLED THERAPEUTIC INTERVENTIONS
impairments found/therapy frequency/rehab potential/predicted duration of therapy intervention/functional limitations in following categories/risk reduction/prevention

## 2018-08-28 NOTE — PROGRESS NOTE ADULT - SUBJECTIVE AND OBJECTIVE BOX
Patient is a 68y old  Male who presents with a chief complaint of AMS (22 Aug 2018 21:56)      INTERVAL HPI/OVERNIGHT EVENTS: no events overnight     MEDICATIONS  (STANDING):  cefTRIAXone   IVPB 2 Gram(s) IV Intermittent every 24 hours  cefTRIAXone   IVPB      dextrose 5%. 1000 milliLiter(s) (50 mL/Hr) IV Continuous <Continuous>  dextrose 50% Injectable 12.5 Gram(s) IV Push once  dextrose 50% Injectable 25 Gram(s) IV Push once  dextrose 50% Injectable 25 Gram(s) IV Push once  docusate sodium 100 milliGRAM(s) Oral two times a day  insulin lispro (HumaLOG) corrective regimen sliding scale   SubCutaneous three times a day before meals  insulin lispro (HumaLOG) corrective regimen sliding scale   SubCutaneous at bedtime  lactobacillus acidophilus 1 Tablet(s) Oral every 12 hours  midodrine 5 milliGRAM(s) Oral every 8 hours    MEDICATIONS  (PRN):  acetaminophen   Tablet 650 milliGRAM(s) Oral every 6 hours PRN For Temp greater than 38 C (100.4 F)  dextrose 40% Gel 15 Gram(s) Oral once PRN Blood Glucose LESS THAN 70 milliGRAM(s)/deciliter  glucagon  Injectable 1 milliGRAM(s) IntraMuscular once PRN Glucose LESS THAN 70 milligrams/deciliter  ibuprofen  Suspension 400 milliGRAM(s) Oral every 6 hours PRN temp> 100.4  polyethylene glycol 3350 17 Gram(s) Oral daily PRN Constipation      Allergies    No Known Allergies    Intolerances          Vital Signs Last 24 Hrs  T(C): 36.7 (28 Aug 2018 11:35), Max: 36.7 (28 Aug 2018 11:35)  T(F): 98 (28 Aug 2018 11:35), Max: 98 (28 Aug 2018 11:35)  HR: 102 (28 Aug 2018 11:45) (74 - 102)  BP: 154/91 (28 Aug 2018 11:45) (114/70 - 154/91)  BP(mean): --  RR: 18 (28 Aug 2018 11:45) (16 - 18)  SpO2: 97% (28 Aug 2018 11:45) (94% - 97%)    PHYSICAL EXAM:  GENERAL: NAD, well-groomed, well-developed  HEAD:  Atraumatic, Normocephalic  EYES: EOMI, PERRLA, icteric   ENMT: No tonsillar erythema, exudates, or enlargement; Moist mucous membranes, Good dentition, No lesions  NECK: Supple, No JVD, Normal thyroid  NERVOUS SYSTEM:  Alert & Oriented X3, Good concentration; Motor Strength 5/5 B/L upper and lower extremities; DTRs 2+ intact and symmetric  CHEST/LUNG: Clear to percussion bilaterally; No rales, rhonchi, wheezing, or rubs  HEART: Regular rate and rhythm; No murmurs, rubs, or gallops  ABDOMEN: Soft, Nontender, Nondistended; Bowel sounds present  EXTREMITIES:  2+ Peripheral Pulses, No clubbing, cyanosis, or edema  LYMPH: No lymphadenopathy noted         LABS:                        11.8   8.76  )-----------( 33       ( 28 Aug 2018 06:21 )             34.3     08-28    139  |  106  |  22  ----------------------------<  129<H>  3.6   |  22  |  0.79    Ca    7.9<L>      28 Aug 2018 06:21  Phos  2.2     08-27  Mg     1.9     08-27    TPro  5.6<L>  /  Alb  1.8<L>  /  TBili  2.0<H>  /  DBili  1.01<H>  /  AST  38<H>  /  ALT  34  /  AlkPhos  73  08-27    PT/INR - ( 28 Aug 2018 06:21 )   PT: 15.3 sec;   INR: 1.39 ratio             CAPILLARY BLOOD GLUCOSE      POCT Blood Glucose.: 153 mg/dL (28 Aug 2018 10:49)  POCT Blood Glucose.: 119 mg/dL (28 Aug 2018 07:24)  POCT Blood Glucose.: 162 mg/dL (27 Aug 2018 21:47)  POCT Blood Glucose.: 161 mg/dL (27 Aug 2018 16:34)  POCT Blood Glucose.: 126 mg/dL (27 Aug 2018 12:21)      RADIOLOGY & ADDITIONAL TESTS:    Imaging Personally Reviewed:  [ X] YES  [ ] NO    Consultant(s) Notes Reviewed:  [ X] YES  [ ] NO    Care Discussed with Consultants/Other Providers [X ] YES  [ ] NO

## 2018-08-28 NOTE — PROGRESS NOTE ADULT - ASSESSMENT
68M PMH of Hep B, liver cirrhosis on transplant list as per family, hepatocellular carcinoma s/p ablation, DM, HTN, CAD w/ stents, chronic thrombocytopenia (platelet count 40s), brought in by family for AMS. Admit to ICU for septic shock due to Ecoli UTI/ bacteremia, respiratory distress in setting of SIRS response requiring BiPAP for work of breathing, acute encephalopathy, ARF with ATN, acute on chronic thrombocytopenia, lactic acidosis.  ID consult called and pt to continue ceftriaxone daily.  Pt noted with low platelets, s/p 1x bag of platelets on 8/24 for platelet count of 11.  Pt currently off bipap and tolerating NC.  Pt also with baseline mental status.  Pt off pressors    - Off pressors  tapering  midodrine, continue current dose for now hold for hypertension  - Blood cultures and urine positive pan-sensitive E. coli, on ceftriaxone.    * Repeat blood cultures gram positive, likely contaminant     * Will need 14 days of antibiotics for bacteremia  - Hepatic failure: [id number], MELD 13, gi following    ID on board    PT EVAL PENDING     monitoring thrombocytopenia no active bleeding

## 2018-08-28 NOTE — PHYSICAL THERAPY INITIAL EVALUATION ADULT - GENERAL OBSERVATIONS, REHAB EVAL
Pt was seen in sitting, alert and Ox4. Wife at bedside. Pt was comfortable and motivated. P.T. is able to communicate with pt c Mandarin.

## 2018-08-29 LAB
ANION GAP SERPL CALC-SCNC: 12 MMOL/L — SIGNIFICANT CHANGE UP (ref 5–17)
BUN SERPL-MCNC: 20 MG/DL — SIGNIFICANT CHANGE UP (ref 7–23)
CALCIUM SERPL-MCNC: 7.5 MG/DL — LOW (ref 8.5–10.1)
CHLORIDE SERPL-SCNC: 107 MMOL/L — SIGNIFICANT CHANGE UP (ref 96–108)
CO2 SERPL-SCNC: 22 MMOL/L — SIGNIFICANT CHANGE UP (ref 22–31)
CREAT SERPL-MCNC: 0.8 MG/DL — SIGNIFICANT CHANGE UP (ref 0.5–1.3)
GLUCOSE SERPL-MCNC: 131 MG/DL — HIGH (ref 70–99)
HCT VFR BLD CALC: 31.1 % — LOW (ref 39–50)
HGB BLD-MCNC: 10.8 G/DL — LOW (ref 13–17)
MCHC RBC-ENTMCNC: 32.2 PG — SIGNIFICANT CHANGE UP (ref 27–34)
MCHC RBC-ENTMCNC: 34.7 GM/DL — SIGNIFICANT CHANGE UP (ref 32–36)
MCV RBC AUTO: 92.8 FL — SIGNIFICANT CHANGE UP (ref 80–100)
NRBC # BLD: 0 /100 WBCS — SIGNIFICANT CHANGE UP (ref 0–0)
PLATELET # BLD AUTO: 40 K/UL — LOW (ref 150–400)
POTASSIUM SERPL-MCNC: 3.5 MMOL/L — SIGNIFICANT CHANGE UP (ref 3.5–5.3)
POTASSIUM SERPL-SCNC: 3.5 MMOL/L — SIGNIFICANT CHANGE UP (ref 3.5–5.3)
RBC # BLD: 3.35 M/UL — LOW (ref 4.2–5.8)
RBC # FLD: 14.7 % — HIGH (ref 10.3–14.5)
SODIUM SERPL-SCNC: 141 MMOL/L — SIGNIFICANT CHANGE UP (ref 135–145)
WBC # BLD: 8.98 K/UL — SIGNIFICANT CHANGE UP (ref 3.8–10.5)
WBC # FLD AUTO: 8.98 K/UL — SIGNIFICANT CHANGE UP (ref 3.8–10.5)

## 2018-08-29 PROCEDURE — 99233 SBSQ HOSP IP/OBS HIGH 50: CPT

## 2018-08-29 RX ORDER — POTASSIUM CHLORIDE 20 MEQ
40 PACKET (EA) ORAL ONCE
Qty: 0 | Refills: 0 | Status: COMPLETED | OUTPATIENT
Start: 2018-08-29 | End: 2018-08-29

## 2018-08-29 RX ADMIN — Medication 1 TABLET(S): at 05:23

## 2018-08-29 RX ADMIN — CEFTRIAXONE 100 GRAM(S): 500 INJECTION, POWDER, FOR SOLUTION INTRAMUSCULAR; INTRAVENOUS at 12:57

## 2018-08-29 RX ADMIN — Medication 2: at 13:03

## 2018-08-29 RX ADMIN — Medication 100 MILLIGRAM(S): at 05:24

## 2018-08-29 RX ADMIN — Medication 40 MILLIEQUIVALENT(S): at 17:12

## 2018-08-29 RX ADMIN — Medication 1 TABLET(S): at 17:13

## 2018-08-29 NOTE — PROGRESS NOTE ADULT - SUBJECTIVE AND OBJECTIVE BOX
Patient is a 68y old  Male who presents with a chief complaint of AMS (22 Aug 2018 21:56)      INTERVAL HPI/OVERNIGHT EVENTS: no events overnight     MEDICATIONS  (STANDING):  cefTRIAXone   IVPB 2 Gram(s) IV Intermittent every 24 hours  cefTRIAXone   IVPB      dextrose 5%. 1000 milliLiter(s) (50 mL/Hr) IV Continuous <Continuous>  dextrose 50% Injectable 12.5 Gram(s) IV Push once  dextrose 50% Injectable 25 Gram(s) IV Push once  dextrose 50% Injectable 25 Gram(s) IV Push once  docusate sodium 100 milliGRAM(s) Oral two times a day  insulin lispro (HumaLOG) corrective regimen sliding scale   SubCutaneous three times a day before meals  insulin lispro (HumaLOG) corrective regimen sliding scale   SubCutaneous at bedtime  lactobacillus acidophilus 1 Tablet(s) Oral every 12 hours    MEDICATIONS  (PRN):  acetaminophen   Tablet 650 milliGRAM(s) Oral every 6 hours PRN For Temp greater than 38 C (100.4 F)  dextrose 40% Gel 15 Gram(s) Oral once PRN Blood Glucose LESS THAN 70 milliGRAM(s)/deciliter  glucagon  Injectable 1 milliGRAM(s) IntraMuscular once PRN Glucose LESS THAN 70 milligrams/deciliter  ibuprofen  Suspension 400 milliGRAM(s) Oral every 6 hours PRN temp> 100.4  polyethylene glycol 3350 17 Gram(s) Oral daily PRN Constipation      Allergies    No Known Allergies    Intolerances           Vital Signs Last 24 Hrs  T(C): 36.7 (29 Aug 2018 05:29), Max: 36.8 (29 Aug 2018 03:41)  T(F): 98.1 (29 Aug 2018 05:29), Max: 98.2 (29 Aug 2018 03:41)  HR: 102 (29 Aug 2018 11:08) (80 - 102)  BP: 156/82 (29 Aug 2018 11:08) (111/66 - 156/82)  BP(mean): --  RR: 18 (29 Aug 2018 11:08) (16 - 19)  SpO2: 97% (29 Aug 2018 11:08) (92% - 98%)    PHYSICAL EXAM:  GENERAL: NAD, well-groomed, well-developed  HEAD:  Atraumatic, Normocephalic  EYES: EOMI, PERRLA, icteric   ENMT: No tonsillar erythema, exudates, or enlargement; Moist mucous membranes, Good dentition, No lesions  NECK: Supple, No JVD, Normal thyroid  NERVOUS SYSTEM:  Alert & Oriented X3, Good concentration; Motor Strength 5/5 B/L upper and lower extremities; DTRs 2+ intact and symmetric  CHEST/LUNG: Clear to percussion bilaterally; No rales, rhonchi, wheezing, or rubs  HEART: Regular rate and rhythm; No murmurs, rubs, or gallops  ABDOMEN: Soft, Nontender, Nondistended; Bowel sounds present  EXTREMITIES:  2+ Peripheral Pulses, No clubbing, cyanosis, or edema  LYMPH: No lymphadenopathy noted         LABS:                        10.8   8.98  )-----------( 40       ( 29 Aug 2018 06:27 )             31.1     08-29    141  |  107  |  20  ----------------------------<  131<H>  3.5   |  22  |  0.80    Ca    7.5<L>      29 Aug 2018 06:27      PT/INR - ( 28 Aug 2018 06:21 )   PT: 15.3 sec;   INR: 1.39 ratio             CAPILLARY BLOOD GLUCOSE      POCT Blood Glucose.: 146 mg/dL (29 Aug 2018 08:24)  POCT Blood Glucose.: 146 mg/dL (28 Aug 2018 22:33)  POCT Blood Glucose.: 177 mg/dL (28 Aug 2018 15:38)      RADIOLOGY & ADDITIONAL TESTS:    Imaging Personally Reviewed:  [ X] YES  [ ] NO    Consultant(s) Notes Reviewed:  [ X] YES  [ ] NO    Care Discussed with Consultants/Other Providers [X ] YES  [ ] NO

## 2018-08-29 NOTE — PROGRESS NOTE ADULT - ASSESSMENT
68M PMH of Hep B, liver cirrhosis on transplant list as per family, hepatocellular carcinoma s/p ablation, DM, HTN, CAD w/ stents, chronic thrombocytopenia (platelet count 40s), brought in by family for AMS. Admit to ICU for septic shock due to Ecoli UTI/ bacteremia, respiratory distress in setting of SIRS response requiring BiPAP for work of breathing, acute encephalopathy, ARF with ATN, acute on chronic thrombocytopenia, lactic acidosis.  ID consult called and pt to continue ceftriaxone daily.  Pt noted with low platelets, s/p 1x bag of platelets on 8/24 for platelet count of 11.  Pt currently off bipap and tolerating NC.  Pt also with baseline mental status.  Pt off pressors    - Off pressors  tapering  midodrine, continue current dose for now hold for hypertension  - Blood cultures and urine positive pan-sensitive E. coli, on ceftriaxone.    * Repeat blood cultures gram positive, likely contaminant     * Will need 10 days of iv abx and 4more of po as per ID  Day 8/10  - Hepatic failure: [id number], MELD 13, gi following    ID on board    PT EVAL appreciated  monitoring thrombocytopenia no active bleeding

## 2018-08-30 LAB
ANION GAP SERPL CALC-SCNC: 11 MMOL/L — SIGNIFICANT CHANGE UP (ref 5–17)
BUN SERPL-MCNC: 18 MG/DL — SIGNIFICANT CHANGE UP (ref 7–23)
CALCIUM SERPL-MCNC: 7.6 MG/DL — LOW (ref 8.5–10.1)
CHLORIDE SERPL-SCNC: 106 MMOL/L — SIGNIFICANT CHANGE UP (ref 96–108)
CO2 SERPL-SCNC: 23 MMOL/L — SIGNIFICANT CHANGE UP (ref 22–31)
CREAT SERPL-MCNC: 0.74 MG/DL — SIGNIFICANT CHANGE UP (ref 0.5–1.3)
GLUCOSE SERPL-MCNC: 141 MG/DL — HIGH (ref 70–99)
HCT VFR BLD CALC: 30.2 % — LOW (ref 39–50)
HGB BLD-MCNC: 10.4 G/DL — LOW (ref 13–17)
MAGNESIUM SERPL-MCNC: 2 MG/DL — SIGNIFICANT CHANGE UP (ref 1.6–2.6)
MCHC RBC-ENTMCNC: 32.6 PG — SIGNIFICANT CHANGE UP (ref 27–34)
MCHC RBC-ENTMCNC: 34.4 GM/DL — SIGNIFICANT CHANGE UP (ref 32–36)
MCV RBC AUTO: 94.7 FL — SIGNIFICANT CHANGE UP (ref 80–100)
NRBC # BLD: 0 /100 WBCS — SIGNIFICANT CHANGE UP (ref 0–0)
PHOSPHATE SERPL-MCNC: 2.2 MG/DL — LOW (ref 2.5–4.5)
PLATELET # BLD AUTO: 48 K/UL — LOW (ref 150–400)
POTASSIUM SERPL-MCNC: 3.7 MMOL/L — SIGNIFICANT CHANGE UP (ref 3.5–5.3)
POTASSIUM SERPL-SCNC: 3.7 MMOL/L — SIGNIFICANT CHANGE UP (ref 3.5–5.3)
RBC # BLD: 3.19 M/UL — LOW (ref 4.2–5.8)
RBC # FLD: 14.8 % — HIGH (ref 10.3–14.5)
SODIUM SERPL-SCNC: 140 MMOL/L — SIGNIFICANT CHANGE UP (ref 135–145)
WBC # BLD: 7.65 K/UL — SIGNIFICANT CHANGE UP (ref 3.8–10.5)
WBC # FLD AUTO: 7.65 K/UL — SIGNIFICANT CHANGE UP (ref 3.8–10.5)

## 2018-08-30 PROCEDURE — 99233 SBSQ HOSP IP/OBS HIGH 50: CPT

## 2018-08-30 RX ORDER — SODIUM,POTASSIUM PHOSPHATES 278-250MG
1 POWDER IN PACKET (EA) ORAL
Qty: 0 | Refills: 0 | Status: COMPLETED | OUTPATIENT
Start: 2018-08-30 | End: 2018-08-31

## 2018-08-30 RX ORDER — CEFPODOXIME PROXETIL 100 MG
1 TABLET ORAL
Qty: 8 | Refills: 0 | OUTPATIENT
Start: 2018-08-30 | End: 2018-09-02

## 2018-08-30 RX ADMIN — Medication 1 TABLET(S): at 05:07

## 2018-08-30 RX ADMIN — Medication 1 TABLET(S): at 08:52

## 2018-08-30 RX ADMIN — CEFTRIAXONE 100 GRAM(S): 500 INJECTION, POWDER, FOR SOLUTION INTRAMUSCULAR; INTRAVENOUS at 11:51

## 2018-08-30 RX ADMIN — Medication 1 TABLET(S): at 11:52

## 2018-08-30 RX ADMIN — Medication 2: at 16:07

## 2018-08-30 RX ADMIN — Medication 100 MILLIGRAM(S): at 05:07

## 2018-08-30 RX ADMIN — Medication 2: at 08:52

## 2018-08-30 RX ADMIN — Medication 1 TABLET(S): at 16:06

## 2018-08-30 RX ADMIN — Medication 2: at 11:51

## 2018-08-30 RX ADMIN — Medication 1 TABLET(S): at 21:25

## 2018-08-30 NOTE — PROGRESS NOTE ADULT - SUBJECTIVE AND OBJECTIVE BOX
Patient is a 68y old  Male who presents with a chief complaint of AMS (30 Aug 2018 10:54)      INTERVAL HPI/OVERNIGHT EVENTS: no events     MEDICATIONS  (STANDING):  cefTRIAXone   IVPB 2 Gram(s) IV Intermittent every 24 hours  cefTRIAXone   IVPB      dextrose 5%. 1000 milliLiter(s) (50 mL/Hr) IV Continuous <Continuous>  dextrose 50% Injectable 12.5 Gram(s) IV Push once  dextrose 50% Injectable 25 Gram(s) IV Push once  dextrose 50% Injectable 25 Gram(s) IV Push once  docusate sodium 100 milliGRAM(s) Oral two times a day  insulin lispro (HumaLOG) corrective regimen sliding scale   SubCutaneous three times a day before meals  insulin lispro (HumaLOG) corrective regimen sliding scale   SubCutaneous at bedtime  lactobacillus acidophilus 1 Tablet(s) Oral every 12 hours  potassium acid phosphate/sodium acid phosphate tablet (K-PHOS No. 2) 1 Tablet(s) Oral four times a day with meals    MEDICATIONS  (PRN):  acetaminophen   Tablet 650 milliGRAM(s) Oral every 6 hours PRN For Temp greater than 38 C (100.4 F)  dextrose 40% Gel 15 Gram(s) Oral once PRN Blood Glucose LESS THAN 70 milliGRAM(s)/deciliter  glucagon  Injectable 1 milliGRAM(s) IntraMuscular once PRN Glucose LESS THAN 70 milligrams/deciliter  ibuprofen  Suspension 400 milliGRAM(s) Oral every 6 hours PRN temp> 100.4  polyethylene glycol 3350 17 Gram(s) Oral daily PRN Constipation      Allergies    No Known Allergies    Intolerances           Vital Signs Last 24 Hrs  T(C): 36.7 (30 Aug 2018 05:00), Max: 37 (29 Aug 2018 17:00)  T(F): 98 (30 Aug 2018 05:00), Max: 98.6 (29 Aug 2018 17:00)  HR: 90 (30 Aug 2018 05:00) (85 - 93)  BP: 136/80 (30 Aug 2018 05:00) (119/65 - 136/80)  BP(mean): --  RR: 18 (30 Aug 2018 05:00) (16 - 18)  SpO2: 97% (30 Aug 2018 05:00) (95% - 97%)    PHYSICAL EXAM:  GENERAL: NAD, well-groomed, well-developed  HEAD:  Atraumatic, Normocephalic  EYES: EOMI, PERRLA, icteric   ENMT: No tonsillar erythema, exudates, or enlargement; Moist mucous membranes, Good dentition, No lesions  NECK: Supple, No JVD, Normal thyroid  NERVOUS SYSTEM:  Alert & Oriented X3, Good concentration; Motor Strength 5/5 B/L upper and lower extremities; DTRs 2+ intact and symmetric  CHEST/LUNG: Clear to percussion bilaterally; No rales, rhonchi, wheezing, or rubs  HEART: Regular rate and rhythm; No murmurs, rubs, or gallops  ABDOMEN: Soft, Nontender, Nondistended; Bowel sounds present  EXTREMITIES:  2+ Peripheral Pulses, No clubbing, cyanosis, or edema  LYMPH: No lymphadenopathy noted  LABS:                        10.4   7.65  )-----------( 48       ( 30 Aug 2018 06:22 )             30.2     08-30    140  |  106  |  18  ----------------------------<  141<H>  3.7   |  23  |  0.74    Ca    7.6<L>      30 Aug 2018 06:22  Phos  2.2     08-30  Mg     2.0     08-30          CAPILLARY BLOOD GLUCOSE      POCT Blood Glucose.: 163 mg/dL (30 Aug 2018 11:47)  POCT Blood Glucose.: 152 mg/dL (30 Aug 2018 08:48)  POCT Blood Glucose.: 163 mg/dL (29 Aug 2018 21:33)  POCT Blood Glucose.: 145 mg/dL (29 Aug 2018 16:45)  POCT Blood Glucose.: 198 mg/dL (29 Aug 2018 12:57)      RADIOLOGY & ADDITIONAL TESTS:    Imaging Personally Reviewed:  [ X] YES  [ ] NO    Consultant(s) Notes Reviewed:  [ X] YES  [ ] NO    Care Discussed with Consultants/Other Providers [X ] YES  [ ] NO

## 2018-08-30 NOTE — DISCHARGE NOTE ADULT - SECONDARY DIAGNOSIS.
Other cirrhosis of liver Primary malignant neoplasm of liver Fever, unspecified fever cause Bacteremia due to Gram-negative bacteria

## 2018-08-30 NOTE — DISCHARGE NOTE ADULT - HOSPITAL COURSE
68M PMH of Hep B, liver cirrhosis on transplant list as per family, hepatocellular carcinoma s/p ablation, DM, HTN, CAD w/ stents, chronic thrombocytopenia (platelet count 40s), brought in by family for AMS. Admit to ICU for septic shock due to Ecoli UTI/ bacteremia, respiratory distress in setting of SIRS response requiring BiPAP for work of breathing, acute encephalopathy, ARF with ATN, acute on chronic thrombocytopenia, lactic acidosis.  ID consult called and pt to continue ceftriaxone daily.  Pt noted with low platelets, s/p 1x bag of platelets on 8/24 for platelet count of 11.  Pt currently off bipap and tolerating NC.  Pt also with baseline mental status.  Pt off pressors    - Off pressors  tapering  midodrine, continue current dose for now hold for hypertension  - Blood cultures and urine positive pan-sensitive E. coli, on ceftriaxone.    * Repeat blood cultures gram positive, likely contaminant     * Will need 10 days of iv abx and 4more of po as per ID  10 days of ceftriaxone completed  - Hepatic failure: [id number], MELD 13, f/u pcp/gi as outpt    PT EVAL appreciated  monitoring thrombocytopenia no active bleeding     Attending Attestation:   45 minutes spent on total dc encounter; more than 50% of the visit was spent counseling and/or coordinating care by the attending physician.

## 2018-08-30 NOTE — DISCHARGE NOTE ADULT - CARE PLAN
Principal Discharge DX:	Hepatic encephalopathy  Goal:	Avoid AMS  Assessment and plan of treatment:	Continue meds as prescribed  Follow up with PMD, GI  Secondary Diagnosis:	Other cirrhosis of liver  Assessment and plan of treatment:	Continue meds as prescribed  Follow up with PMD, GI  Secondary Diagnosis:	Primary malignant neoplasm of liver  Assessment and plan of treatment:	Continue meds as prescribed  Follow up with PMD, GI  Secondary Diagnosis:	Fever, unspecified fever cause  Assessment and plan of treatment:	Resolved  Complete PO ABX  Secondary Diagnosis:	Bacteremia due to Gram-negative bacteria  Assessment and plan of treatment:	Resolved  Complete PO ABX

## 2018-08-30 NOTE — DISCHARGE NOTE ADULT - CARE PROVIDER_API CALL
Pramod Randall), Internal Medicine  [address] [place] [address]  Phone: [phone]  Fax: [phone]    Demian Chester), Medicine  [address] [place] [address]  Phone: [phone]  Fax: [phone]

## 2018-08-30 NOTE — DISCHARGE NOTE ADULT - PATIENT PORTAL LINK FT
You can access the [facility] Patient Portal, offered by [facility], by registering with the following website: [email or web address]

## 2018-08-30 NOTE — PROGRESS NOTE ADULT - ASSESSMENT
68M PMH of Hep B, liver cirrhosis on transplant list as per family, hepatocellular carcinoma s/p ablation, DM, HTN, CAD w/ stents, chronic thrombocytopenia (platelet count 40s), brought in by family for AMS. Admit to ICU for septic shock due to Ecoli UTI/ bacteremia, respiratory distress in setting of SIRS response requiring BiPAP for work of breathing, acute encephalopathy, ARF with ATN, acute on chronic thrombocytopenia, lactic acidosis.  ID consult called and pt to continue ceftriaxone daily.  Pt noted with low platelets, s/p 1x bag of platelets on 8/24 for platelet count of 11.  Pt currently off bipap and tolerating NC.  Pt also with baseline mental status.  Pt off pressors    - Off pressors  tapering  midodrine, continue current dose for now hold for hypertension  - Blood cultures and urine positive pan-sensitive E. coli, on ceftriaxone.    * Repeat blood cultures gram positive, likely contaminant     * Will need 10 days of iv abx and 4more of po as per ID  Day 8/10 to complete on Saturday   - Hepatic failure: [id number], MELD 13, gi following    ID on board    PT EVAL appreciated  monitoring thrombocytopenia no active bleeding

## 2018-08-31 LAB
ANION GAP SERPL CALC-SCNC: 9 MMOL/L — SIGNIFICANT CHANGE UP (ref 5–17)
BUN SERPL-MCNC: 16 MG/DL — SIGNIFICANT CHANGE UP (ref 7–23)
CALCIUM SERPL-MCNC: 7.6 MG/DL — LOW (ref 8.5–10.1)
CHLORIDE SERPL-SCNC: 105 MMOL/L — SIGNIFICANT CHANGE UP (ref 96–108)
CO2 SERPL-SCNC: 27 MMOL/L — SIGNIFICANT CHANGE UP (ref 22–31)
CREAT SERPL-MCNC: 0.76 MG/DL — SIGNIFICANT CHANGE UP (ref 0.5–1.3)
CULTURE RESULTS: SIGNIFICANT CHANGE UP
GLUCOSE SERPL-MCNC: 124 MG/DL — HIGH (ref 70–99)
HCT VFR BLD CALC: 32.8 % — LOW (ref 39–50)
HGB BLD-MCNC: 11 G/DL — LOW (ref 13–17)
MCHC RBC-ENTMCNC: 32.2 PG — SIGNIFICANT CHANGE UP (ref 27–34)
MCHC RBC-ENTMCNC: 33.5 GM/DL — SIGNIFICANT CHANGE UP (ref 32–36)
MCV RBC AUTO: 95.9 FL — SIGNIFICANT CHANGE UP (ref 80–100)
NRBC # BLD: 0 /100 WBCS — SIGNIFICANT CHANGE UP (ref 0–0)
PLATELET # BLD AUTO: 68 K/UL — LOW (ref 150–400)
POTASSIUM SERPL-MCNC: 3.8 MMOL/L — SIGNIFICANT CHANGE UP (ref 3.5–5.3)
POTASSIUM SERPL-SCNC: 3.8 MMOL/L — SIGNIFICANT CHANGE UP (ref 3.5–5.3)
RBC # BLD: 3.42 M/UL — LOW (ref 4.2–5.8)
RBC # FLD: 15.2 % — HIGH (ref 10.3–14.5)
SODIUM SERPL-SCNC: 141 MMOL/L — SIGNIFICANT CHANGE UP (ref 135–145)
SPECIMEN SOURCE: SIGNIFICANT CHANGE UP
WBC # BLD: 9.52 K/UL — SIGNIFICANT CHANGE UP (ref 3.8–10.5)
WBC # FLD AUTO: 9.52 K/UL — SIGNIFICANT CHANGE UP (ref 3.8–10.5)

## 2018-08-31 PROCEDURE — 99233 SBSQ HOSP IP/OBS HIGH 50: CPT

## 2018-08-31 RX ADMIN — Medication 1 TABLET(S): at 05:37

## 2018-08-31 RX ADMIN — Medication 1 TABLET(S): at 17:14

## 2018-08-31 RX ADMIN — Medication 2: at 11:18

## 2018-08-31 RX ADMIN — Medication 100 MILLIGRAM(S): at 17:14

## 2018-08-31 RX ADMIN — Medication 4: at 15:51

## 2018-08-31 RX ADMIN — Medication 2: at 07:40

## 2018-08-31 RX ADMIN — Medication 100 MILLIGRAM(S): at 05:37

## 2018-08-31 RX ADMIN — CEFTRIAXONE 100 GRAM(S): 500 INJECTION, POWDER, FOR SOLUTION INTRAMUSCULAR; INTRAVENOUS at 12:17

## 2018-08-31 RX ADMIN — Medication 0: at 22:16

## 2018-08-31 NOTE — CHART NOTE - NSCHARTNOTEFT_GEN_A_CORE
Assessment: Pt seen for follow-up. Pt speaks mostly Mandarin, speaks some English. Pt's wife who speaks English at beside, pt was offered [employer] services but declined, prefers wife to do interpreting.    Factors impacting intake: [ ] none [ ] nausea  [ ] vomiting [ ] diarrhea [ ] constipation  [ ]chewing problems [ ] swallowing issues  [x] other: AMS    Diet Prescription: Diet, Consistent Carbohydrate w/Evening Snack:   Dysphagia 3, Soft, Thin Liquids ([id number])  Low Sodium  Supplement Feeding Modality:  Oral  Glucerna Shake Cans or Servings Per Day:  1       Frequency:  Daily (08-26-18 @ 15:20)    Intake: Pt reported good appetite, po intake mostly %; Pt is assisted with tray set up    Current Weight: 73 kg (8/31), 73.9 kg (8/22)  % Weight Change: 1.2% (0.9 kg) wt loss x 9 days during hospitalization        Pertinent Medications: MEDICATIONS  (STANDING):  cefTRIAXone   IVPB 2 Gram(s) IV Intermittent every 24 hours  cefTRIAXone   IVPB      dextrose 5%. 1000 milliLiter(s) (50 mL/Hr) IV Continuous <Continuous>  dextrose 50% Injectable 12.5 Gram(s) IV Push once  dextrose 50% Injectable 25 Gram(s) IV Push once  dextrose 50% Injectable 25 Gram(s) IV Push once  docusate sodium 100 milliGRAM(s) Oral two times a day  insulin lispro (HumaLOG) corrective regimen sliding scale   SubCutaneous three times a day before meals  insulin lispro (HumaLOG) corrective regimen sliding scale   SubCutaneous at bedtime  lactobacillus acidophilus 1 Tablet(s) Oral every 12 hours    MEDICATIONS  (PRN):  acetaminophen   Tablet 650 milliGRAM(s) Oral every 6 hours PRN For Temp greater than 38 C (100.4 F)  dextrose 40% Gel 15 Gram(s) Oral once PRN Blood Glucose LESS THAN 70 milliGRAM(s)/deciliter  glucagon  Injectable 1 milliGRAM(s) IntraMuscular once PRN Glucose LESS THAN 70 milligrams/deciliter  ibuprofen  Suspension 400 milliGRAM(s) Oral every 6 hours PRN temp> 100.4  polyethylene glycol 3350 17 Gram(s) Oral daily PRN Constipation    Pertinent Labs: 08-31 Na141 mmol/L Glu 124 mg/dL<H> K+ 3.8 mmol/L Cr  0.76 mg/dL BUN 16 mg/dL 08-30 Phos 2.2 mg/dL<L> 08-27 Alb 1.8 g/dL<L> 08-23 [id number] 6.8 %<H>     CAPILLARY BLOOD GLUCOSE      POCT Blood Glucose.: 152 mg/dL (31 Aug 2018 07:36)  POCT Blood Glucose.: 187 mg/dL (30 Aug 2018 21:24)  POCT Blood Glucose.: 160 mg/dL (30 Aug 2018 16:04)  POCT Blood Glucose.: 163 mg/dL (30 Aug 2018 11:47)    Skin: Pt is without pressure ulcers; generalized edema 1+    Estimated Needs:   [x] no change since previous assessment on 8/23  [ ] recalculated:     Previous Nutrition Diagnosis:   [ ] Inadequate Energy Intake [ ]Inadequate Oral Intake [ ] Excessive Energy Intake   [ ] Underweight [ ] Increased Nutrient Needs [ ] Overweight/Obesity   [ ] Altered GI Function [ ] Unintended Weight Loss [ ] Food & Nutrition Related Knowledge Deficit [ ] Malnutrition     Nutrition Diagnosis is [ ] ongoing  [ ] resolved [ ] not applicable     New Nutrition Diagnosis: [ ] not applicable       Interventions:   Recommend  [ ] Change Diet To:  [ ] Nutrition Supplement  [ ] Nutrition Support  [ ] Other:     Monitoring and Evaluation:   [ ] PO intake [ x ] Tolerance to diet prescription [ x ] weights [ x ] labs[ x ] follow up per protocol  [ ] other: Assessment: Pt seen for follow-up. Pt speaks mostly Mandarin, some English. Pt's wife who speaks English at beside, pt was offered [employer] services but declined, prefers wife interprets for him.    Factors impacting intake: [ ] none [ ] nausea  [ ] vomiting [ ] diarrhea [ ] constipation  [ ] chewing problems [ ] swallowing issues  [x] other: AMS    Diet Prescription: Diet, Consistent Carbohydrate w/Evening Snack:   Dysphagia 3, Soft, Thin Liquids ([id number])  Low Sodium  Supplement Feeding Modality:  Oral  Glucerna Shake Cans or Servings Per Day:  1       Frequency:  Daily (08-26-18 @ 15:20)    Intake: Pt reported good appetite, po intake mostly %; Pt is assisted with tray set up    Current Weight: 73 kg (8/31), 73.9 kg (8/22)  % Weight Change: 1.2% (0.9 kg) wt loss x 9 days during hospitalization    Nutrition focused physical exam conducted; Subcutaneous fat loss: [mild] Orbital fat pads region, [WNL]Buccal fat region, [mild]Triceps region, [unable]Ribs region.  Muscle wasting: [mild]Temples region, [mild]Clavicle region, [mild]Shoulder region, [mild]Scapula region, [mild]Interosseous region, [WNL]thigh region, [mild]Calf region    Pertinent Medications: MEDICATIONS  (STANDING):  cefTRIAXone   IVPB 2 Gram(s) IV Intermittent every 24 hours  cefTRIAXone   IVPB      dextrose 5%. 1000 milliLiter(s) (50 mL/Hr) IV Continuous <Continuous>  dextrose 50% Injectable 12.5 Gram(s) IV Push once  dextrose 50% Injectable 25 Gram(s) IV Push once  dextrose 50% Injectable 25 Gram(s) IV Push once  docusate sodium 100 milliGRAM(s) Oral two times a day  insulin lispro (HumaLOG) corrective regimen sliding scale   SubCutaneous three times a day before meals  insulin lispro (HumaLOG) corrective regimen sliding scale   SubCutaneous at bedtime  lactobacillus acidophilus 1 Tablet(s) Oral every 12 hours    MEDICATIONS  (PRN):  acetaminophen   Tablet 650 milliGRAM(s) Oral every 6 hours PRN For Temp greater than 38 C (100.4 F)  dextrose 40% Gel 15 Gram(s) Oral once PRN Blood Glucose LESS THAN 70 milliGRAM(s)/deciliter  glucagon  Injectable 1 milliGRAM(s) IntraMuscular once PRN Glucose LESS THAN 70 milligrams/deciliter  ibuprofen  Suspension 400 milliGRAM(s) Oral every 6 hours PRN temp> 100.4  polyethylene glycol 3350 17 Gram(s) Oral daily PRN Constipation    Pertinent Labs: 08-31 Na141 mmol/L Glu 124 mg/dL<H> K+ 3.8 mmol/L Cr  0.76 mg/dL BUN 16 mg/dL 08-30 Phos 2.2 mg/dL<L> 08-27 Alb 1.8 g/dL<L> 08-23 [id number] 6.8 %<H>     CAPILLARY BLOOD GLUCOSE      POCT Blood Glucose.: 152 mg/dL (31 Aug 2018 07:36)  POCT Blood Glucose.: 187 mg/dL (30 Aug 2018 21:24)  POCT Blood Glucose.: 160 mg/dL (30 Aug 2018 16:04)  POCT Blood Glucose.: 163 mg/dL (30 Aug 2018 11:47)    Skin: Pt is without pressure ulcers; Pt did not appear to have edema when NFPA conducted    Estimated Needs:   [x] no change since previous assessment on 8/23  [ ] recalculated:     Previous Nutrition Diagnosis:   [x] Inadequate Energy Intake [ ]Inadequate Oral Intake [ ] Excessive Energy Intake   [ ] Underweight [ ] Increased Nutrient Needs [ ] Overweight/Obesity   [ ] Altered GI Function [ ] Unintended Weight Loss [ ] Food & Nutrition Related Knowledge Deficit [ ] Malnutrition     Nutrition Diagnosis is [ ] ongoing  [x] resolving [ ] not applicable    New Diagnosis is [ ] not applicable [ ] Inadequate Energy Intake [ ]Inadequate Oral Intake [ ] Excessive Energy Intake   [ ] Underweight [ ] Increased Nutrient Needs [ ] Overweight/Obesity   [ ] Altered GI Function [ ] Unintended Weight Loss [ ] Food & Nutrition Related Knowledge Deficit [x] Malnutrition - Mild, acute    Related to: Inadequate energy/protein intake related to AMS    As evidenced by: Physical signs of mild muscle wasting and fat loss as noted above      Interventions:   Recommend  [ ] Change Diet To:  [x] Nutrition Supplement: Continue Glucerna 1x daily (220 kcals & 10gm protein)  [ ] Nutrition Support  [x] Other: Continue current diet rx    Monitoring and Evaluation:   [ x ] PO intake [ x ] Tolerance to diet prescription [ x ] weights [ x ] labs[ x ] follow up per protocol  [ ] other: Assessment: Pt seen for follow-up. Pt with liver cirrhosis awaiting transplant, DM, HTN, CAD w/ stents, thrombocytopenia, s/p septic shock for Ecoli UTI/bacteremia and respiratory distress on BiPAP, currently off BiPAP on nasal canula. Pt speaks mostly Mandarin, some English. Pt's wife who speaks English at beside, pt was offered [employer] services but declined, prefers wife interprets for him.    Factors impacting intake: [ ] none [ ] nausea  [ ] vomiting [ ] diarrhea [ ] constipation  [ ] chewing problems [ ] swallowing issues  [x] other: AMS (improved to baseline)    Diet Prescription: Diet, Consistent Carbohydrate w/Evening Snack:   Dysphagia 3, Soft, Thin Liquids ([id number])  Low Sodium  Supplement Feeding Modality:  Oral  Glucerna Shake Cans or Servings Per Day:  1       Frequency:  Daily (08-26-18 @ 15:20)    Intake: Pt reported good appetite, po intake mostly %; Pt is assisted with tray set up    Current Weight: 73 kg (8/31), 73.9 kg (8/22)  % Weight Change: 1.2% (0.9 kg) wt loss x 9 days during hospitalization    Nutrition focused physical exam conducted; Subcutaneous fat loss: [mild] Orbital fat pads region, [WNL]Buccal fat region, [mild]Triceps region, [unable]Ribs region.  Muscle wasting: [mild]Temples region, [mild]Clavicle region, [mild]Shoulder region, [mild]Scapula region, [mild]Interosseous region, [WNL]thigh region, [mild]Calf region    Pertinent Medications: MEDICATIONS  (STANDING):  cefTRIAXone   IVPB 2 Gram(s) IV Intermittent every 24 hours  cefTRIAXone   IVPB      dextrose 5%. 1000 milliLiter(s) (50 mL/Hr) IV Continuous <Continuous>  dextrose 50% Injectable 12.5 Gram(s) IV Push once  dextrose 50% Injectable 25 Gram(s) IV Push once  dextrose 50% Injectable 25 Gram(s) IV Push once  docusate sodium 100 milliGRAM(s) Oral two times a day  insulin lispro (HumaLOG) corrective regimen sliding scale   SubCutaneous three times a day before meals  insulin lispro (HumaLOG) corrective regimen sliding scale   SubCutaneous at bedtime  lactobacillus acidophilus 1 Tablet(s) Oral every 12 hours    MEDICATIONS  (PRN):  acetaminophen   Tablet 650 milliGRAM(s) Oral every 6 hours PRN For Temp greater than 38 C (100.4 F)  dextrose 40% Gel 15 Gram(s) Oral once PRN Blood Glucose LESS THAN 70 milliGRAM(s)/deciliter  glucagon  Injectable 1 milliGRAM(s) IntraMuscular once PRN Glucose LESS THAN 70 milligrams/deciliter  ibuprofen  Suspension 400 milliGRAM(s) Oral every 6 hours PRN temp> 100.4  polyethylene glycol 3350 17 Gram(s) Oral daily PRN Constipation    Pertinent Labs: 08-31 Na141 mmol/L Glu 124 mg/dL<H> K+ 3.8 mmol/L Cr  0.76 mg/dL BUN 16 mg/dL 08-30 Phos 2.2 mg/dL<L> 08-27 Alb 1.8 g/dL<L> 08-23 [id number] 6.8 %<H>     CAPILLARY BLOOD GLUCOSE      POCT Blood Glucose.: 152 mg/dL (31 Aug 2018 07:36)  POCT Blood Glucose.: 187 mg/dL (30 Aug 2018 21:24)  POCT Blood Glucose.: 160 mg/dL (30 Aug 2018 16:04)  POCT Blood Glucose.: 163 mg/dL (30 Aug 2018 11:47)    Skin: Pt is without pressure ulcers; Pt did not appear to have edema when NFPA conducted    Estimated Needs:   [x] no change since previous assessment on 8/23  [ ] recalculated:     Previous Nutrition Diagnosis:   [x] Inadequate Energy Intake [ ]Inadequate Oral Intake [ ] Excessive Energy Intake   [ ] Underweight [ ] Increased Nutrient Needs [ ] Overweight/Obesity   [ ] Altered GI Function [ ] Unintended Weight Loss [ ] Food & Nutrition Related Knowledge Deficit [ ] Malnutrition     Nutrition Diagnosis is [ ] ongoing  [x] resolving [ ] not applicable    New Diagnosis is [ ] not applicable [ ] Inadequate Energy Intake [ ]Inadequate Oral Intake [ ] Excessive Energy Intake   [ ] Underweight [ ] Increased Nutrient Needs [ ] Overweight/Obesity   [ ] Altered GI Function [ ] Unintended Weight Loss [ ] Food & Nutrition Related Knowledge Deficit [x] Malnutrition - Mild, acute    Related to: Inadequate energy/protein intake related to AMS    As evidenced by: Physical signs of mild muscle wasting and fat loss as noted above      Interventions:   Recommend  [ ] Change Diet To:  [x] Nutrition Supplement: Continue Glucerna 1x daily (220 kcals & 10gm protein)  [ ] Nutrition Support  [x] Other: Continue current diet rx    Monitoring and Evaluation:   [ x ] PO intake [ x ] Tolerance to diet prescription [ x ] weights [ x ] labs[ x ] follow up per protocol  [ ] other: Assessment: Pt seen for follow-up. Pt with liver cirrhosis awaiting transplant, DM, HTN, CAD w/ stents, thrombocytopenia, s/p septic shock for Ecoli UTI/bacteremia and respiratory distress on BiPAP, currently off BiPAP on nasal canula. Pt speaks mostly Mandarin, some English. Pt's wife who speaks English at beside, pt was offered [employer] services but declined, prefers wife interprets for him.    Factors impacting intake: [ ] none [ ] nausea  [ ] vomiting [ ] diarrhea [ ] constipation  [ ] chewing problems [ ] swallowing issues  [x] other: AMS (improved to baseline)    Diet Prescription: Diet, Consistent Carbohydrate w/Evening Snack:   Dysphagia 3, Soft, Thin Liquids ([id number])  Low Sodium  Supplement Feeding Modality:  Oral  Glucerna Shake Cans or Servings Per Day:  1       Frequency:  Daily (08-26-18 @ 15:20)    Intake: Pt reported good appetite, po intake mostly %; Pt is assisted with tray set up    Current Weight: 73 kg (8/31), 73.9 kg (8/22)  % Weight Change: 1.2% (0.9 kg) wt loss x 9 days during hospitalization    Nutrition focused physical exam conducted; Subcutaneous fat loss: [mild] Orbital fat pads region, [WNL]Buccal fat region, [mild]Triceps region, [unable]Ribs region.  Muscle wasting: [mild]Temples region, [mild]Clavicle region, [mild]Shoulder region, [mild]Scapula region, [mild]Interosseous region, [WNL]thigh region, [mild]Calf region    Pertinent Medications: MEDICATIONS  (STANDING):  cefTRIAXone   IVPB 2 Gram(s) IV Intermittent every 24 hours  cefTRIAXone   IVPB      dextrose 5%. 1000 milliLiter(s) (50 mL/Hr) IV Continuous <Continuous>  dextrose 50% Injectable 12.5 Gram(s) IV Push once  dextrose 50% Injectable 25 Gram(s) IV Push once  dextrose 50% Injectable 25 Gram(s) IV Push once  docusate sodium 100 milliGRAM(s) Oral two times a day  insulin lispro (HumaLOG) corrective regimen sliding scale   SubCutaneous three times a day before meals  insulin lispro (HumaLOG) corrective regimen sliding scale   SubCutaneous at bedtime  lactobacillus acidophilus 1 Tablet(s) Oral every 12 hours    MEDICATIONS  (PRN):  acetaminophen   Tablet 650 milliGRAM(s) Oral every 6 hours PRN For Temp greater than 38 C (100.4 F)  dextrose 40% Gel 15 Gram(s) Oral once PRN Blood Glucose LESS THAN 70 milliGRAM(s)/deciliter  glucagon  Injectable 1 milliGRAM(s) IntraMuscular once PRN Glucose LESS THAN 70 milligrams/deciliter  ibuprofen  Suspension 400 milliGRAM(s) Oral every 6 hours PRN temp> 100.4  polyethylene glycol 3350 17 Gram(s) Oral daily PRN Constipation    Pertinent Labs: 08-31 Na141 mmol/L Glu 124 mg/dL<H> K+ 3.8 mmol/L Cr  0.76 mg/dL BUN 16 mg/dL 08-30 Phos 2.2 mg/dL<L> 08-27 Alb 1.8 g/dL<L> 08-23 [id number] 6.8 %<H>     CAPILLARY BLOOD GLUCOSE      POCT Blood Glucose.: 152 mg/dL (31 Aug 2018 07:36)  POCT Blood Glucose.: 187 mg/dL (30 Aug 2018 21:24)  POCT Blood Glucose.: 160 mg/dL (30 Aug 2018 16:04)  POCT Blood Glucose.: 163 mg/dL (30 Aug 2018 11:47)    Skin: Pt is without pressure ulcers; Pt did not appear to have edema when NFPA conducted    Estimated Needs:   [x] no change since previous assessment on 8/23  [ ] recalculated:     Previous Nutrition Diagnosis:   [x] Inadequate Energy Intake [ ]Inadequate Oral Intake [ ] Excessive Energy Intake   [ ] Underweight [ ] Increased Nutrient Needs [ ] Overweight/Obesity   [ ] Altered GI Function [ ] Unintended Weight Loss [ ] Food & Nutrition Related Knowledge Deficit [ ] Malnutrition     Nutrition Diagnosis is [ ] ongoing  [x] resolving [ ] not applicable    New Diagnosis is [ ] not applicable [ ] Inadequate Energy Intake [ ]Inadequate Oral Intake [ ] Excessive Energy Intake   [ ] Underweight [ ] Increased Nutrient Needs [ ] Overweight/Obesity   [ ] Altered GI Function [ ] Unintended Weight Loss [ ] Food & Nutrition Related Knowledge Deficit [x] Malnutrition - Mild, acute    Related to: Inadequate energy/protein intake related to AMS    As evidenced by: Physical signs of mild muscle wasting and fat loss as noted above      Interventions:   Recommend  [ ] Change Diet To:  [x] Nutrition Supplement: Continue Glucerna 1x daily (220 kcals & 10gm protein)  [ ] Nutrition Support  [x] Other: Continue current diet rx; Provided pt and pt's wife with Diabetes and Low Sodium MNT literature/education    Monitoring and Evaluation:   [ x ] PO intake [ x ] Tolerance to diet prescription [ x ] weights [ x ] labs[ x ] follow up per protocol  [ ] other: Assessment: Pt seen for follow-up. Pt with liver cirrhosis awaiting transplant, DM, HTN, CAD w/ stents, thrombocytopenia, s/p septic shock for Ecoli UTI/bacteremia and respiratory distress on BiPAP, currently off BiPAP on nasal canula. Pt speaks mostly Mandarin, some English. Pt's wife who speaks English present at beside, pt prefers wife interprets for him.    Factors impacting intake: [ ] none [ ] nausea  [ ] vomiting [ ] diarrhea [ ] constipation  [ ] chewing problems [ ] swallowing issues  [x] other: AMS (improved to baseline)    Diet Prescription: Diet, Consistent Carbohydrate w/Evening Snack:   Dysphagia 3, Soft, Thin Liquids ([id number])  Low Sodium  Supplement Feeding Modality:  Oral  Glucerna Shake Cans or Servings Per Day:  1       Frequency:  Daily (08-26-18 @ 15:20)    Intake: Pt reported good appetite, po intake mostly %; Pt is assisted with tray set up    Current Weight: 73 kg (8/31), 73.9 kg (8/22)  % Weight Change: 1.2% (0.9 kg) wt loss x 9 days during hospitalization    Nutrition focused physical exam conducted; Subcutaneous fat loss: [mild] Orbital fat pads region, [WNL]Buccal fat region, [mild]Triceps region, [unable]Ribs region.  Muscle wasting: [mild]Temples region, [mild]Clavicle region, [mild]Shoulder region, [mild]Scapula region, [mild]Interosseous region, [WNL]thigh region, [mild]Calf region    Pertinent Medications: MEDICATIONS  (STANDING):  cefTRIAXone   IVPB 2 Gram(s) IV Intermittent every 24 hours  cefTRIAXone   IVPB      dextrose 5%. 1000 milliLiter(s) (50 mL/Hr) IV Continuous <Continuous>  dextrose 50% Injectable 12.5 Gram(s) IV Push once  dextrose 50% Injectable 25 Gram(s) IV Push once  dextrose 50% Injectable 25 Gram(s) IV Push once  docusate sodium 100 milliGRAM(s) Oral two times a day  insulin lispro (HumaLOG) corrective regimen sliding scale   SubCutaneous three times a day before meals  insulin lispro (HumaLOG) corrective regimen sliding scale   SubCutaneous at bedtime  lactobacillus acidophilus 1 Tablet(s) Oral every 12 hours    MEDICATIONS  (PRN):  acetaminophen   Tablet 650 milliGRAM(s) Oral every 6 hours PRN For Temp greater than 38 C (100.4 F)  dextrose 40% Gel 15 Gram(s) Oral once PRN Blood Glucose LESS THAN 70 milliGRAM(s)/deciliter  glucagon  Injectable 1 milliGRAM(s) IntraMuscular once PRN Glucose LESS THAN 70 milligrams/deciliter  ibuprofen  Suspension 400 milliGRAM(s) Oral every 6 hours PRN temp> 100.4  polyethylene glycol 3350 17 Gram(s) Oral daily PRN Constipation    Pertinent Labs: 08-31 Na141 mmol/L Glu 124 mg/dL<H> K+ 3.8 mmol/L Cr  0.76 mg/dL BUN 16 mg/dL 08-30 Phos 2.2 mg/dL<L> 08-27 Alb 1.8 g/dL<L> 08-23 [id number] 6.8 %<H>     CAPILLARY BLOOD GLUCOSE      POCT Blood Glucose.: 152 mg/dL (31 Aug 2018 07:36)  POCT Blood Glucose.: 187 mg/dL (30 Aug 2018 21:24)  POCT Blood Glucose.: 160 mg/dL (30 Aug 2018 16:04)  POCT Blood Glucose.: 163 mg/dL (30 Aug 2018 11:47)    Skin: Pt is without pressure ulcers; Pt did not appear to have edema when NFPA conducted    Estimated Needs:   [x] no change since previous assessment on 8/23  [ ] recalculated:     Previous Nutrition Diagnosis:   [x] Inadequate Energy Intake [ ]Inadequate Oral Intake [ ] Excessive Energy Intake   [ ] Underweight [ ] Increased Nutrient Needs [ ] Overweight/Obesity   [ ] Altered GI Function [ ] Unintended Weight Loss [ ] Food & Nutrition Related Knowledge Deficit [ ] Malnutrition     Nutrition Diagnosis is [ ] ongoing  [x] resolving [ ] not applicable    New Diagnosis is [ ] not applicable [ ] Inadequate Energy Intake [ ]Inadequate Oral Intake [ ] Excessive Energy Intake   [ ] Underweight [ ] Increased Nutrient Needs [ ] Overweight/Obesity   [ ] Altered GI Function [ ] Unintended Weight Loss [ ] Food & Nutrition Related Knowledge Deficit [x] Malnutrition - Mild, acute    Related to: Inadequate energy/protein intake related to AMS    As evidenced by: Physical signs of mild muscle wasting and fat loss as noted above      Interventions:   Recommend  [ ] Change Diet To:  [x] Nutrition Supplement: Continue Glucerna 1x daily (220 kcals & 10gm protein)  [ ] Nutrition Support  [x] Other: Continue current diet rx; Provided pt and pt's wife with Diabetes and Low Sodium MNT literature/education    Monitoring and Evaluation:   [ x ] PO intake [ x ] Tolerance to diet prescription [ x ] weights [ x ] labs[ x ] follow up per protocol  [ ] other:

## 2018-08-31 NOTE — PROGRESS NOTE ADULT - SUBJECTIVE AND OBJECTIVE BOX
Patient is a 68y old  Male who presents with a chief complaint of AMS (30 Aug 2018 10:54)      INTERVAL HPI/OVERNIGHT EVENTS: no events     MEDICATIONS  (STANDING):  cefTRIAXone   IVPB 2 Gram(s) IV Intermittent every 24 hours  cefTRIAXone   IVPB      dextrose 5%. 1000 milliLiter(s) (50 mL/Hr) IV Continuous <Continuous>  dextrose 50% Injectable 12.5 Gram(s) IV Push once  dextrose 50% Injectable 25 Gram(s) IV Push once  dextrose 50% Injectable 25 Gram(s) IV Push once  docusate sodium 100 milliGRAM(s) Oral two times a day  insulin lispro (HumaLOG) corrective regimen sliding scale   SubCutaneous three times a day before meals  insulin lispro (HumaLOG) corrective regimen sliding scale   SubCutaneous at bedtime  lactobacillus acidophilus 1 Tablet(s) Oral every 12 hours    MEDICATIONS  (PRN):  acetaminophen   Tablet 650 milliGRAM(s) Oral every 6 hours PRN For Temp greater than 38 C (100.4 F)  dextrose 40% Gel 15 Gram(s) Oral once PRN Blood Glucose LESS THAN 70 milliGRAM(s)/deciliter  glucagon  Injectable 1 milliGRAM(s) IntraMuscular once PRN Glucose LESS THAN 70 milligrams/deciliter  ibuprofen  Suspension 400 milliGRAM(s) Oral every 6 hours PRN temp> 100.4  polyethylene glycol 3350 17 Gram(s) Oral daily PRN Constipation      Allergies    No Known Allergies    Intolerances             Vital Signs Last 24 Hrs  T(C): 36.2 (31 Aug 2018 05:28), Max: 36.7 (30 Aug 2018 17:00)  T(F): 97.1 (31 Aug 2018 05:28), Max: 98.1 (30 Aug 2018 23:42)  HR: 77 (31 Aug 2018 05:28) (77 - 83)  BP: 108/64 (31 Aug 2018 05:28) (108/64 - 132/66)  BP(mean): --  RR: 18 (31 Aug 2018 05:28) (17 - 18)  SpO2: 96% (31 Aug 2018 05:28) (96% - 96%)    PHYSICAL EXAM:  GENERAL: NAD, well-groomed, well-developed  HEAD:  Atraumatic, Normocephalic  EYES: EOMI, PERRLA, icteric   ENMT: No tonsillar erythema, exudates, or enlargement; Moist mucous membranes, Good dentition, No lesions  NECK: Supple, No JVD, Normal thyroid  NERVOUS SYSTEM:  Alert & Oriented X3, Good concentration; Motor Strength 5/5 B/L upper and lower extremities; DTRs 2+ intact and symmetric  CHEST/LUNG: Clear to percussion bilaterally; No rales, rhonchi, wheezing, or rubs  HEART: Regular rate and rhythm; No murmurs, rubs, or gallops  ABDOMEN: Soft, Nontender, Nondistended; Bowel sounds present  EXTREMITIES:  2+ Peripheral Pulses, No clubbing, cyanosis, or edema  LYMPH: No lymphadenopathy noted    LABS:                        11.0   9.52  )-----------( 68       ( 31 Aug 2018 06:54 )             32.8     08-31    141  |  105  |  16  ----------------------------<  124<H>  3.8   |  27  |  0.76    Ca    7.6<L>      31 Aug 2018 06:54  Phos  2.2     08-30  Mg     2.0     08-30          CAPILLARY BLOOD GLUCOSE      POCT Blood Glucose.: 198 mg/dL (31 Aug 2018 11:12)  POCT Blood Glucose.: 152 mg/dL (31 Aug 2018 07:36)  POCT Blood Glucose.: 187 mg/dL (30 Aug 2018 21:24)  POCT Blood Glucose.: 160 mg/dL (30 Aug 2018 16:04)      RADIOLOGY & ADDITIONAL TESTS:    Imaging Personally Reviewed:  [ X] YES  [ ] NO    Consultant(s) Notes Reviewed:  [ X] YES  [ ] NO    Care Discussed with Consultants/Other Providers [X ] YES  [ ] NO

## 2018-08-31 NOTE — PROGRESS NOTE ADULT - PROVIDER SPECIALTY LIST ADULT
Critical Care
Gastroenterology
Hospitalist
Infectious Disease
Gastroenterology

## 2018-08-31 NOTE — CHART NOTE - NSCHARTNOTEFT_GEN_A_CORE
Upon Nutritional Assessment by the Registered Dietitian your patient was determined to meet criteria / has evidence of the following diagnosis/diagnoses:          [x]  Mild Protein Calorie Malnutrition        [ ]  Moderate Protein Calorie Malnutrition        [ ] Severe Protein Calorie Malnutrition        [ ] Unspecified Protein Calorie Malnutrition        [ ] Underweight / BMI <19        [ ] Morbid Obesity / BMI > 40      Findings as based on:  •  Comprehensive nutrition assessment and consultation  •  Calorie counts (nutrient intake analysis)  •  Food acceptance and intake status from observations by staff  •  Follow up  •  Patient education  •  Intervention secondary to interdisciplinary rounds  •   concerns      Treatment:    The following diet has been recommended:  Continue current diet order    PROVIDER Section:     By signing this assessment you are acknowledging and agree with the diagnosis/diagnoses assigned by the Registered Dietitian    Comments:

## 2018-09-01 VITALS
TEMPERATURE: 98 F | OXYGEN SATURATION: 95 % | HEART RATE: 89 BPM | RESPIRATION RATE: 18 BRPM | SYSTOLIC BLOOD PRESSURE: 125 MMHG | WEIGHT: 160.72 LBS | DIASTOLIC BLOOD PRESSURE: 71 MMHG

## 2018-09-01 LAB
ANION GAP SERPL CALC-SCNC: 7 MMOL/L — SIGNIFICANT CHANGE UP (ref 5–17)
BUN SERPL-MCNC: 16 MG/DL — SIGNIFICANT CHANGE UP (ref 7–23)
CALCIUM SERPL-MCNC: 7.4 MG/DL — LOW (ref 8.5–10.1)
CHLORIDE SERPL-SCNC: 106 MMOL/L — SIGNIFICANT CHANGE UP (ref 96–108)
CO2 SERPL-SCNC: 27 MMOL/L — SIGNIFICANT CHANGE UP (ref 22–31)
CREAT SERPL-MCNC: 0.81 MG/DL — SIGNIFICANT CHANGE UP (ref 0.5–1.3)
CULTURE RESULTS: SIGNIFICANT CHANGE UP
GLUCOSE SERPL-MCNC: 145 MG/DL — HIGH (ref 70–99)
HCT VFR BLD CALC: 30.8 % — LOW (ref 39–50)
HGB BLD-MCNC: 10.3 G/DL — LOW (ref 13–17)
MCHC RBC-ENTMCNC: 32.5 PG — SIGNIFICANT CHANGE UP (ref 27–34)
MCHC RBC-ENTMCNC: 33.4 GM/DL — SIGNIFICANT CHANGE UP (ref 32–36)
MCV RBC AUTO: 97.2 FL — SIGNIFICANT CHANGE UP (ref 80–100)
NRBC # BLD: 0 /100 WBCS — SIGNIFICANT CHANGE UP (ref 0–0)
PLATELET # BLD AUTO: 59 K/UL — LOW (ref 150–400)
POTASSIUM SERPL-MCNC: 3.7 MMOL/L — SIGNIFICANT CHANGE UP (ref 3.5–5.3)
POTASSIUM SERPL-SCNC: 3.7 MMOL/L — SIGNIFICANT CHANGE UP (ref 3.5–5.3)
RBC # BLD: 3.17 M/UL — LOW (ref 4.2–5.8)
RBC # FLD: 15.5 % — HIGH (ref 10.3–14.5)
SODIUM SERPL-SCNC: 140 MMOL/L — SIGNIFICANT CHANGE UP (ref 135–145)
SPECIMEN SOURCE: SIGNIFICANT CHANGE UP
WBC # BLD: 6.53 K/UL — SIGNIFICANT CHANGE UP (ref 3.8–10.5)
WBC # FLD AUTO: 6.53 K/UL — SIGNIFICANT CHANGE UP (ref 3.8–10.5)

## 2018-09-01 PROCEDURE — 99239 HOSP IP/OBS DSCHRG MGMT >30: CPT

## 2018-09-01 RX ADMIN — Medication 100 MILLIGRAM(S): at 05:22

## 2018-09-01 RX ADMIN — Medication 2: at 09:02

## 2018-09-01 RX ADMIN — CEFTRIAXONE 100 GRAM(S): 500 INJECTION, POWDER, FOR SOLUTION INTRAMUSCULAR; INTRAVENOUS at 09:03

## 2018-09-01 RX ADMIN — Medication 1 TABLET(S): at 05:22

## 2018-09-06 DIAGNOSIS — D69.6 THROMBOCYTOPENIA, UNSPECIFIED: ICD-10-CM

## 2018-09-06 DIAGNOSIS — I10 ESSENTIAL (PRIMARY) HYPERTENSION: ICD-10-CM

## 2018-09-06 DIAGNOSIS — E87.2 ACIDOSIS: ICD-10-CM

## 2018-09-06 DIAGNOSIS — I25.10 ATHEROSCLEROTIC HEART DISEASE OF NATIVE CORONARY ARTERY WITHOUT ANGINA PECTORIS: ICD-10-CM

## 2018-09-06 DIAGNOSIS — Z85.05 PERSONAL HISTORY OF MALIGNANT NEOPLASM OF LIVER: ICD-10-CM

## 2018-09-06 DIAGNOSIS — R06.03 ACUTE RESPIRATORY DISTRESS: ICD-10-CM

## 2018-09-06 DIAGNOSIS — K72.90 HEPATIC FAILURE, UNSPECIFIED WITHOUT COMA: ICD-10-CM

## 2018-09-06 DIAGNOSIS — Z98.890 OTHER SPECIFIED POSTPROCEDURAL STATES: ICD-10-CM

## 2018-09-06 DIAGNOSIS — Z86.19 PERSONAL HISTORY OF OTHER INFECTIOUS AND PARASITIC DISEASES: ICD-10-CM

## 2018-09-06 DIAGNOSIS — E11.9 TYPE 2 DIABETES MELLITUS WITHOUT COMPLICATIONS: ICD-10-CM

## 2018-09-06 DIAGNOSIS — Z79.4 LONG TERM (CURRENT) USE OF INSULIN: ICD-10-CM

## 2018-09-06 DIAGNOSIS — A41.51 SEPSIS DUE TO ESCHERICHIA COLI [E. COLI]: ICD-10-CM

## 2018-09-06 DIAGNOSIS — R65.21 SEVERE SEPSIS WITH SEPTIC SHOCK: ICD-10-CM

## 2018-09-06 DIAGNOSIS — N39.0 URINARY TRACT INFECTION, SITE NOT SPECIFIED: ICD-10-CM

## 2018-09-06 DIAGNOSIS — E44.1 MILD PROTEIN-CALORIE MALNUTRITION: ICD-10-CM

## 2018-09-06 DIAGNOSIS — N17.0 ACUTE KIDNEY FAILURE WITH TUBULAR NECROSIS: ICD-10-CM

## 2018-09-06 DIAGNOSIS — Z95.5 PRESENCE OF CORONARY ANGIOPLASTY IMPLANT AND GRAFT: ICD-10-CM

## 2018-09-06 DIAGNOSIS — K74.69 OTHER CIRRHOSIS OF LIVER: ICD-10-CM

## 2020-03-03 NOTE — DIETITIAN INITIAL EVALUATION ADULT. - ENERGY NEEDS
5'9",  Wt=73.9kg(8/22/18),  IBW=73kg+/-10%,  100% IBW,   BMI=24,  UBW=75kg,  99% UBW
Universal Safety Interventions

## 2020-11-22 NOTE — DIETITIAN INITIAL EVALUATION ADULT. - COPY OF WEIGHT IN KG
54-year-old male with HTN, anxiety, GERD,     Tm 99.     EMS recorded SpO2 92% (at same time patient was using his home pulse ox, reading was 85%) 54-year-old male with HTN, anxiety, GERD, presenting from home due to hypoxia noted on outpatient SpO2 monitoring.  Patient reports being diagnosed with COVID 11 days ago, he's since completed a Z-tesha (Rx'ed 11/17) and is on day 3 of methylprednisolone dose tesha. He reports being mostly asymptomatic however 2 days ago he developed a cough productive of yellow sputum. He has been monitoring his SpO2 at home and was getting a reading of 85% earlier today prompting him to call EMS. Patient notes that on EMS arrival he check his SpO2 at the same time as EMS and EMS had a recording of 92% while patient's oximeter continued to read 85%. He reports a Tmax at home of 99, denies chills. No shortness of breath or EDGAR. He notes some slight chest discomfort associated with coughing episodes but otherwise denies chest pain or palpitations.     In the ED VS:  101  96  137/89  20  94% on 2L NC, desaturated to 81% on ambulation; received 1L NS IVF and ibuprofen 600mg PO x1 73.9

## 2021-04-26 NOTE — PHYSICAL THERAPY INITIAL EVALUATION ADULT - LEVEL OF INDEPENDENCE: SCOOT/BRIDGE, REHAB EVAL
independent GOAL: Pt will ambulate with or without appropriate assistive device x 100 feet with supervision in 2 weeks.

## 2021-05-10 NOTE — CONSULT NOTE ADULT - PROVIDER SPECIALTY LIST ADULT
Gastroenterology Detail Level: Simple Price (Do Not Change): 0.00 Instructions: This plan will send the code FBSE to the PM system.  DO NOT or CHANGE the price.

## 2022-11-09 NOTE — PROGRESS NOTE ADULT - SUBJECTIVE AND OBJECTIVE BOX
TMAX - 98.2    On day # 7 Rocephin as total rx now    Vital Signs Last 24 Hrs  T(C): 36.7 (29 Aug 2018 11:58), Max: 36.8 (29 Aug 2018 03:41)  T(F): 98 (29 Aug 2018 11:58), Max: 98.2 (29 Aug 2018 03:41)  HR: 102 (29 Aug 2018 11:08) (80 - 102)  BP: 156/82 (29 Aug 2018 11:08) (111/66 - 156/82)  BP(mean): --  RR: 18 (29 Aug 2018 11:08) (18 - 19)  SpO2: 97% (29 Aug 2018 11:08) (95% - 98%)  Supplemental O2:  on RA now      Awake, alert, no c/o cp, no abdominal pain, no N/V/D, and no c/o difficulty voiding.  Appetite is improving and patient ambulated well today with PT.      PHYSICAL EXAM  General:  awake, alert, lying in bed, cooperative, in NAD now  HEENT: conj pink, sclerae minimally icteric, PERRLA, no oral lesions noted   Neck:  supple, no nodes noted  Heart: RR   Lungs:  clear bilaterally  Abdomen:  BS+, soft, nontender to palpation  No CVA or Spinal tenderness to palpation  Extremities:  no edema LE's                    no calf tenderness noted  Skin:  warm, dry, no rash noted      I&O's Summary :    28 Aug 2018 07:01  -  29 Aug 2018 07:00  --------------------------------------------------------  IN: 940 mL / OUT: 500 mL / NET: 440 mL        LABS:  CBC Full  -  ( 29 Aug 2018 06:27 )  WBC Count : 8.98 K/uL  Hemoglobin : 10.8 g/dL  Hematocrit : 31.1 %  Platelet Count - Automated : 40 K/uL  Mean Cell Volume : 92.8 fl  Mean Cell Hemoglobin : 32.2 pg  Mean Cell Hemoglobin Concentration : 34.7 gm/dL  Auto Neutrophil # : x  Auto Lymphocyte # : x  Auto Monocyte # : x  Auto Eosinophil # : x  Auto Basophil # : x  Auto Neutrophil % : x  Auto Lymphocyte % : x  Auto Monocyte % : x  Auto Eosinophil % : x  Auto Basophil % : x    08-29    141  |  107  |  20  ----------------------------<  131<H>  3.5   |  22  |  0.80    Ca    7.5<L>      29 Aug 2018 06:27      PT/INR - ( 28 Aug 2018 06:21 )   PT: 15.3 sec;   INR: 1.39 ratio       Sedimentation Rate, Erythrocyte: 44 mm/hr (08-28 @ 06:21)    Sedimentation Rate, Erythrocyte: 50 mm/hr (08-25 @ 03:00)        LABS:  Specimen Source: .Blood Blood (08-27 @ 09:30)  Culture Results:   No growth to date. (08-27 @ 09:30)    Specimen Source: .Blood Blood-Peripheral (08-26 @ 11:39)  Culture Results:   No growth to date. (08-26 @ 11:39)    Specimen Source: .Blood Blood-Peripheral (08-26 @ 11:39)  Culture Results:   Growth in anaerobic bottle: Bacillus species not anthracis (08-26 @ 11:39)    Specimen Source: .Urine Clean Catch (Midstream) (08-23 @ 00:25)  Culture Results:   >100,000 CFU/ml Escherichia coli (08-23 @ 00:25)    Specimen Source: .Blood Blood (08-22 @ 18:00)  Culture Results:   Growth in aerobic and anaerobic bottles: Escherichia coli        Specimen Source: .Blood Blood (08-22 @ 18:00)  Culture Results:   Growth in aerobic and anaerobic bottles: Escherichia coli  See previous culture [id number] (08-22 @ 18:00)          Impression:  Clinically improving slowly on present ab rx with Rocephin now for continued rx of Sepsis with initial Shock due to EColi Bacteremia and UTI.    Suggestions:  Will continue current ab rx with Rocephin to complete a minimum of 10 days of rx with IV ( today is day # 7 ) and if patient then remains clinically improved, would have no objection  from ID standpoint to d/c to home on PO Vantin to complete total of 14 days of ab rx ( ie, 4 more days of po ab ),  Discussed in detail with patient and his wife at bedside. No